# Patient Record
Sex: FEMALE | Race: BLACK OR AFRICAN AMERICAN | NOT HISPANIC OR LATINO | Employment: OTHER | URBAN - METROPOLITAN AREA
[De-identification: names, ages, dates, MRNs, and addresses within clinical notes are randomized per-mention and may not be internally consistent; named-entity substitution may affect disease eponyms.]

---

## 2019-09-23 ENCOUNTER — APPOINTMENT (OUTPATIENT)
Dept: PREADMISSION TESTING | Facility: HOSPITAL | Age: 74
End: 2019-09-23

## 2019-09-23 ENCOUNTER — HOSPITAL ENCOUNTER (OUTPATIENT)
Dept: GENERAL RADIOLOGY | Facility: HOSPITAL | Age: 74
Discharge: HOME | End: 2019-09-23
Admitting: ORTHOPAEDIC SURGERY

## 2019-09-23 VITALS — WEIGHT: 156.97 LBS | BODY MASS INDEX: 28.89 KG/M2 | HEIGHT: 62 IN

## 2019-09-23 LAB
ANION GAP SERPL CALCULATED.3IONS-SCNC: 11 MMOL/L (ref 5–15)
BUN BLD-MCNC: 13 MG/DL (ref 8–23)
BUN/CREAT SERPL: 18.1 (ref 7–25)
CALCIUM SPEC-SCNC: 9.1 MG/DL (ref 8.6–10.5)
CHLORIDE SERPL-SCNC: 102 MMOL/L (ref 98–107)
CO2 SERPL-SCNC: 28 MMOL/L (ref 22–29)
CREAT BLD-MCNC: 0.72 MG/DL (ref 0.57–1)
DEPRECATED RDW RBC AUTO: 49 FL (ref 37–54)
ERYTHROCYTE [DISTWIDTH] IN BLOOD BY AUTOMATED COUNT: 14.4 % (ref 12.3–15.4)
GFR SERPL CREATININE-BSD FRML MDRD: 97 ML/MIN/1.73
GLUCOSE BLD-MCNC: 125 MG/DL (ref 65–99)
HBA1C MFR BLD: 6.6 % (ref 4.8–5.6)
HCT VFR BLD AUTO: 39.4 % (ref 34–46.6)
HGB BLD-MCNC: 12.3 G/DL (ref 12–15.9)
MCH RBC QN AUTO: 28.9 PG (ref 26.6–33)
MCHC RBC AUTO-ENTMCNC: 31.2 G/DL (ref 31.5–35.7)
MCV RBC AUTO: 92.5 FL (ref 79–97)
PLATELET # BLD AUTO: 236 10*3/MM3 (ref 140–450)
PMV BLD AUTO: 9.7 FL (ref 6–12)
POTASSIUM BLD-SCNC: 4.3 MMOL/L (ref 3.5–5.2)
RBC # BLD AUTO: 4.26 10*6/MM3 (ref 3.77–5.28)
SODIUM BLD-SCNC: 141 MMOL/L (ref 136–145)
WBC NRBC COR # BLD: 6.64 10*3/MM3 (ref 3.4–10.8)

## 2019-09-23 PROCEDURE — 93010 ELECTROCARDIOGRAM REPORT: CPT | Performed by: INTERNAL MEDICINE

## 2019-09-23 PROCEDURE — 80048 BASIC METABOLIC PNL TOTAL CA: CPT | Performed by: ORTHOPAEDIC SURGERY

## 2019-09-23 PROCEDURE — 71046 X-RAY EXAM CHEST 2 VIEWS: CPT

## 2019-09-23 PROCEDURE — 36415 COLL VENOUS BLD VENIPUNCTURE: CPT

## 2019-09-23 PROCEDURE — 85027 COMPLETE CBC AUTOMATED: CPT | Performed by: ORTHOPAEDIC SURGERY

## 2019-09-23 PROCEDURE — 83036 HEMOGLOBIN GLYCOSYLATED A1C: CPT | Performed by: ORTHOPAEDIC SURGERY

## 2019-09-23 PROCEDURE — 93005 ELECTROCARDIOGRAM TRACING: CPT

## 2019-09-23 RX ORDER — ASPIRIN 81 MG/1
81 TABLET ORAL DAILY
COMMUNITY

## 2019-09-23 RX ORDER — ATORVASTATIN CALCIUM 40 MG/1
40 TABLET, FILM COATED ORAL DAILY
COMMUNITY

## 2019-09-23 RX ORDER — METOPROLOL SUCCINATE 25 MG/1
25 TABLET, EXTENDED RELEASE ORAL DAILY
COMMUNITY

## 2019-09-23 RX ORDER — RANITIDINE 150 MG/1
150 TABLET ORAL 2 TIMES DAILY
COMMUNITY

## 2019-09-23 NOTE — PAT
Lion with surgery scheduling notified of patient's primary language (English is patient's secondary language) of Tamil.      Patient instructed to drink 20 ounces (or until full) of Gatorade and it needs to be completed 1 hour before given arrival time for procedure (NO RED Gatorade)    Patient verbalized understanding.    Patient given a prescription for Benzol Peroxide 5% wash during PAT visit.  Instructed them to fill the prescription or  from [facility] pharmacy if was submitted electronically by the surgeon's office.  Verbal and written instructions given regarding proper use of the Benzoyl Peroxide wash given to patient and/or famlily during PAT visit. Patient/family also instructed to complete checklist and return it to Pre-op on the day of surgery.  Patient and/or family verbalized understanding.      Additionally, reinforced with patient to acquire this prescription from the [facility] retail pharmacy before leaving the hospital after PAT visit due to the potential unavailability at local pharmacies.

## 2019-09-29 ENCOUNTER — ANESTHESIA EVENT (OUTPATIENT)
Dept: PERIOP | Facility: HOSPITAL | Age: 74
End: 2019-09-29

## 2019-09-29 RX ORDER — FAMOTIDINE 10 MG/ML
20 INJECTION, SOLUTION INTRAVENOUS ONCE
Status: CANCELLED | OUTPATIENT
Start: 2019-09-29 | End: 2019-09-29

## 2019-09-29 RX ORDER — SODIUM CHLORIDE 0.9 % (FLUSH) 0.9 %
3 SYRINGE (ML) INJECTION EVERY 12 HOURS SCHEDULED
Status: CANCELLED | OUTPATIENT
Start: 2019-09-29

## 2019-09-29 RX ORDER — SODIUM CHLORIDE 0.9 % (FLUSH) 0.9 %
3-10 SYRINGE (ML) INJECTION AS NEEDED
Status: CANCELLED | OUTPATIENT
Start: 2019-09-29

## 2019-09-30 ENCOUNTER — APPOINTMENT (OUTPATIENT)
Dept: GENERAL RADIOLOGY | Facility: HOSPITAL | Age: 74
End: 2019-09-30

## 2019-09-30 ENCOUNTER — ANESTHESIA (OUTPATIENT)
Dept: PERIOP | Facility: HOSPITAL | Age: 74
End: 2019-09-30

## 2019-09-30 ENCOUNTER — HOSPITAL ENCOUNTER (INPATIENT)
Facility: HOSPITAL | Age: 74
LOS: 1 days | Discharge: HOME HEALTH | End: 2019-10-01
Attending: ORTHOPAEDIC SURGERY | Admitting: ORTHOPAEDIC SURGERY

## 2019-09-30 DIAGNOSIS — Z74.09 IMPAIRED FUNCTIONAL MOBILITY, BALANCE, GAIT, AND ENDURANCE: ICD-10-CM

## 2019-09-30 DIAGNOSIS — Z96.612 STATUS POST SHOULDER REPLACEMENT, LEFT: ICD-10-CM

## 2019-09-30 DIAGNOSIS — Z74.09 IMPAIRED MOBILITY AND ADLS: Primary | ICD-10-CM

## 2019-09-30 DIAGNOSIS — Z78.9 IMPAIRED MOBILITY AND ADLS: Primary | ICD-10-CM

## 2019-09-30 PROBLEM — F41.9 ANXIETY: Chronic | Status: ACTIVE | Noted: 2019-09-30

## 2019-09-30 PROBLEM — K21.9 GERD (GASTROESOPHAGEAL REFLUX DISEASE): Chronic | Status: ACTIVE | Noted: 2019-09-30

## 2019-09-30 PROBLEM — E11.9 DIABETES MELLITUS (HCC): Chronic | Status: ACTIVE | Noted: 2019-09-30

## 2019-09-30 PROBLEM — I10 HYPERTENSION: Chronic | Status: ACTIVE | Noted: 2019-09-30

## 2019-09-30 PROBLEM — M19.90 ARTHRITIS: Chronic | Status: ACTIVE | Noted: 2019-09-30

## 2019-09-30 LAB
GLUCOSE BLDC GLUCOMTR-MCNC: 100 MG/DL (ref 70–130)
GLUCOSE BLDC GLUCOMTR-MCNC: 117 MG/DL (ref 70–130)
GLUCOSE BLDC GLUCOMTR-MCNC: 139 MG/DL (ref 70–130)
GLUCOSE BLDC GLUCOMTR-MCNC: 209 MG/DL (ref 70–130)

## 2019-09-30 PROCEDURE — C1713 ANCHOR/SCREW BN/BN,TIS/BN: HCPCS | Performed by: ORTHOPAEDIC SURGERY

## 2019-09-30 PROCEDURE — C1776 JOINT DEVICE (IMPLANTABLE): HCPCS | Performed by: ORTHOPAEDIC SURGERY

## 2019-09-30 PROCEDURE — 97110 THERAPEUTIC EXERCISES: CPT | Performed by: OCCUPATIONAL THERAPIST

## 2019-09-30 PROCEDURE — 25010000002 FENTANYL CITRATE (PF) 100 MCG/2ML SOLUTION: Performed by: NURSE ANESTHETIST, CERTIFIED REGISTERED

## 2019-09-30 PROCEDURE — 25010000002 PROPOFOL 10 MG/ML EMULSION: Performed by: NURSE ANESTHETIST, CERTIFIED REGISTERED

## 2019-09-30 PROCEDURE — L3670 SO ACRO/CLAV CAN WEB PRE OTS: HCPCS | Performed by: ORTHOPAEDIC SURGERY

## 2019-09-30 PROCEDURE — 82962 GLUCOSE BLOOD TEST: CPT

## 2019-09-30 PROCEDURE — 25010000002 PHENYLEPHRINE PER 1 ML: Performed by: NURSE ANESTHETIST, CERTIFIED REGISTERED

## 2019-09-30 PROCEDURE — 97535 SELF CARE MNGMENT TRAINING: CPT | Performed by: OCCUPATIONAL THERAPIST

## 2019-09-30 PROCEDURE — 25010000002 ROPIVACAINE PER 1 MG: Performed by: NURSE ANESTHETIST, CERTIFIED REGISTERED

## 2019-09-30 PROCEDURE — 97530 THERAPEUTIC ACTIVITIES: CPT | Performed by: OCCUPATIONAL THERAPIST

## 2019-09-30 PROCEDURE — 0RRK0JZ REPLACEMENT OF LEFT SHOULDER JOINT WITH SYNTHETIC SUBSTITUTE, OPEN APPROACH: ICD-10-PCS | Performed by: ORTHOPAEDIC SURGERY

## 2019-09-30 PROCEDURE — 63710000001 INSULIN LISPRO (HUMAN) PER 5 UNITS: Performed by: NURSE PRACTITIONER

## 2019-09-30 PROCEDURE — 97165 OT EVAL LOW COMPLEX 30 MIN: CPT | Performed by: OCCUPATIONAL THERAPIST

## 2019-09-30 PROCEDURE — 25010000003 CEFAZOLIN IN DEXTROSE 2-4 GM/100ML-% SOLUTION: Performed by: ORTHOPAEDIC SURGERY

## 2019-09-30 PROCEDURE — 25010000002 VANCOMYCIN PER 500 MG: Performed by: ORTHOPAEDIC SURGERY

## 2019-09-30 PROCEDURE — 73030 X-RAY EXAM OF SHOULDER: CPT

## 2019-09-30 PROCEDURE — 25010000002 DEXAMETHASONE PER 1 MG: Performed by: NURSE ANESTHETIST, CERTIFIED REGISTERED

## 2019-09-30 PROCEDURE — 0LS40ZZ REPOSITION LEFT UPPER ARM TENDON, OPEN APPROACH: ICD-10-PCS | Performed by: ORTHOPAEDIC SURGERY

## 2019-09-30 PROCEDURE — 25010000003 LIDOCAINE 1 % SOLUTION: Performed by: NURSE ANESTHETIST, CERTIFIED REGISTERED

## 2019-09-30 PROCEDURE — 99252 IP/OBS CONSLTJ NEW/EST SF 35: CPT | Performed by: NURSE PRACTITIONER

## 2019-09-30 PROCEDURE — 25010000002 ONDANSETRON PER 1 MG: Performed by: NURSE ANESTHETIST, CERTIFIED REGISTERED

## 2019-09-30 DEVICE — SCRW EQUINOXE TORQ DEFINE KT SQ: Type: IMPLANTABLE DEVICE | Site: SHOULDER | Status: FUNCTIONAL

## 2019-09-30 DEVICE — STEM HUM EQUINOXE PRESERVE 7MM: Type: IMPLANTABLE DEVICE | Site: SHOULDER | Status: FUNCTIONAL

## 2019-09-30 DEVICE — IMPLANTABLE DEVICE: Type: IMPLANTABLE DEVICE | Site: SHOULDER | Status: FUNCTIONAL

## 2019-09-30 DEVICE — HD HUM EQUINOXE SHT 41MM: Type: IMPLANTABLE DEVICE | Site: SHOULDER | Status: FUNCTIONAL

## 2019-09-30 DEVICE — SUT FW #2 W/TPR NDL 1/2 CIR 38IN 97CM 26.5MM BLU: Type: IMPLANTABLE DEVICE | Site: SHOULDER | Status: FUNCTIONAL

## 2019-09-30 DEVICE — TOTL SHLDR ARTHROPLASTY SYS: Type: IMPLANTABLE DEVICE | Site: SHOULDER | Status: FUNCTIONAL

## 2019-09-30 DEVICE — SMARTSET HIGH PERFORMANCE MV MEDIUM VISCOSITY BONE CEMENT 40G
Type: IMPLANTABLE DEVICE | Site: SHOULDER | Status: FUNCTIONAL
Brand: SMARTSET

## 2019-09-30 DEVICE — PLT HUM EQUINOXE REPLICAT OFFST 4.5: Type: IMPLANTABLE DEVICE | Site: SHOULDER | Status: FUNCTIONAL

## 2019-09-30 DEVICE — TOTL SHLDR AUG PLT ARTHROPLASTY UPCHRG: Type: IMPLANTABLE DEVICE | Site: SHOULDER | Status: FUNCTIONAL

## 2019-09-30 RX ORDER — HYDRALAZINE HYDROCHLORIDE 20 MG/ML
5 INJECTION INTRAMUSCULAR; INTRAVENOUS
Status: DISCONTINUED | OUTPATIENT
Start: 2019-09-30 | End: 2019-09-30 | Stop reason: HOSPADM

## 2019-09-30 RX ORDER — HYDROMORPHONE HYDROCHLORIDE 1 MG/ML
0.5 INJECTION, SOLUTION INTRAMUSCULAR; INTRAVENOUS; SUBCUTANEOUS
Status: DISCONTINUED | OUTPATIENT
Start: 2019-09-30 | End: 2019-10-01 | Stop reason: HOSPADM

## 2019-09-30 RX ORDER — DULOXETIN HYDROCHLORIDE 20 MG/1
40 CAPSULE, DELAYED RELEASE ORAL DAILY
Status: DISCONTINUED | OUTPATIENT
Start: 2019-10-01 | End: 2019-10-01 | Stop reason: HOSPADM

## 2019-09-30 RX ORDER — METOPROLOL SUCCINATE 25 MG/1
25 TABLET, EXTENDED RELEASE ORAL DAILY
Status: DISCONTINUED | OUTPATIENT
Start: 2019-10-01 | End: 2019-10-01 | Stop reason: HOSPADM

## 2019-09-30 RX ORDER — GLYCOPYRROLATE 0.2 MG/ML
INJECTION INTRAMUSCULAR; INTRAVENOUS AS NEEDED
Status: DISCONTINUED | OUTPATIENT
Start: 2019-09-30 | End: 2019-09-30 | Stop reason: SURG

## 2019-09-30 RX ORDER — PROMETHAZINE HYDROCHLORIDE 25 MG/ML
6.25 INJECTION, SOLUTION INTRAMUSCULAR; INTRAVENOUS ONCE AS NEEDED
Status: DISCONTINUED | OUTPATIENT
Start: 2019-09-30 | End: 2019-09-30 | Stop reason: HOSPADM

## 2019-09-30 RX ORDER — SODIUM CHLORIDE, SODIUM LACTATE, POTASSIUM CHLORIDE, CALCIUM CHLORIDE 600; 310; 30; 20 MG/100ML; MG/100ML; MG/100ML; MG/100ML
9 INJECTION, SOLUTION INTRAVENOUS CONTINUOUS
Status: DISCONTINUED | OUTPATIENT
Start: 2019-09-30 | End: 2019-10-01 | Stop reason: HOSPADM

## 2019-09-30 RX ORDER — ATRACURIUM BESYLATE 10 MG/ML
INJECTION, SOLUTION INTRAVENOUS AS NEEDED
Status: DISCONTINUED | OUTPATIENT
Start: 2019-09-30 | End: 2019-09-30 | Stop reason: SURG

## 2019-09-30 RX ORDER — MAGNESIUM HYDROXIDE 1200 MG/15ML
LIQUID ORAL AS NEEDED
Status: DISCONTINUED | OUTPATIENT
Start: 2019-09-30 | End: 2019-09-30 | Stop reason: HOSPADM

## 2019-09-30 RX ORDER — ONDANSETRON 2 MG/ML
4 INJECTION INTRAMUSCULAR; INTRAVENOUS ONCE AS NEEDED
Status: DISCONTINUED | OUTPATIENT
Start: 2019-09-30 | End: 2019-09-30 | Stop reason: HOSPADM

## 2019-09-30 RX ORDER — LIDOCAINE HYDROCHLORIDE 10 MG/ML
INJECTION, SOLUTION INFILTRATION; PERINEURAL AS NEEDED
Status: DISCONTINUED | OUTPATIENT
Start: 2019-09-30 | End: 2019-09-30 | Stop reason: SURG

## 2019-09-30 RX ORDER — FENTANYL CITRATE 50 UG/ML
50 INJECTION, SOLUTION INTRAMUSCULAR; INTRAVENOUS
Status: DISCONTINUED | OUTPATIENT
Start: 2019-09-30 | End: 2019-09-30 | Stop reason: HOSPADM

## 2019-09-30 RX ORDER — DEXAMETHASONE SODIUM PHOSPHATE 4 MG/ML
INJECTION, SOLUTION INTRA-ARTICULAR; INTRALESIONAL; INTRAMUSCULAR; INTRAVENOUS; SOFT TISSUE AS NEEDED
Status: DISCONTINUED | OUTPATIENT
Start: 2019-09-30 | End: 2019-09-30 | Stop reason: SURG

## 2019-09-30 RX ORDER — DEXTROSE MONOHYDRATE 25 G/50ML
25 INJECTION, SOLUTION INTRAVENOUS
Status: DISCONTINUED | OUTPATIENT
Start: 2019-09-30 | End: 2019-10-01 | Stop reason: HOSPADM

## 2019-09-30 RX ORDER — HYDROCODONE BITARTRATE AND ACETAMINOPHEN 10; 325 MG/1; MG/1
1 TABLET ORAL EVERY 4 HOURS PRN
Status: CANCELLED | OUTPATIENT
Start: 2019-09-30 | End: 2019-10-10

## 2019-09-30 RX ORDER — NEOSTIGMINE METHYLSULFATE 5 MG/5 ML
SYRINGE (ML) INTRAVENOUS AS NEEDED
Status: DISCONTINUED | OUTPATIENT
Start: 2019-09-30 | End: 2019-09-30 | Stop reason: SURG

## 2019-09-30 RX ORDER — LABETALOL HYDROCHLORIDE 5 MG/ML
5 INJECTION, SOLUTION INTRAVENOUS
Status: DISCONTINUED | OUTPATIENT
Start: 2019-09-30 | End: 2019-09-30 | Stop reason: HOSPADM

## 2019-09-30 RX ORDER — PROMETHAZINE HYDROCHLORIDE 25 MG/1
25 TABLET ORAL ONCE AS NEEDED
Status: DISCONTINUED | OUTPATIENT
Start: 2019-09-30 | End: 2019-09-30 | Stop reason: HOSPADM

## 2019-09-30 RX ORDER — DOCUSATE SODIUM 100 MG/1
100 CAPSULE, LIQUID FILLED ORAL 2 TIMES DAILY PRN
Status: DISCONTINUED | OUTPATIENT
Start: 2019-09-30 | End: 2019-10-01 | Stop reason: HOSPADM

## 2019-09-30 RX ORDER — ONDANSETRON 2 MG/ML
INJECTION INTRAMUSCULAR; INTRAVENOUS AS NEEDED
Status: DISCONTINUED | OUTPATIENT
Start: 2019-09-30 | End: 2019-09-30 | Stop reason: SURG

## 2019-09-30 RX ORDER — OXYCODONE HYDROCHLORIDE 5 MG/1
5 TABLET ORAL EVERY 4 HOURS PRN
Status: DISCONTINUED | OUTPATIENT
Start: 2019-09-30 | End: 2019-10-01 | Stop reason: HOSPADM

## 2019-09-30 RX ORDER — PROPOFOL 10 MG/ML
VIAL (ML) INTRAVENOUS AS NEEDED
Status: DISCONTINUED | OUTPATIENT
Start: 2019-09-30 | End: 2019-09-30 | Stop reason: SURG

## 2019-09-30 RX ORDER — FAMOTIDINE 20 MG/1
20 TABLET, FILM COATED ORAL
Status: DISCONTINUED | OUTPATIENT
Start: 2019-09-30 | End: 2019-10-01 | Stop reason: HOSPADM

## 2019-09-30 RX ORDER — SODIUM CHLORIDE 450 MG/100ML
50 INJECTION, SOLUTION INTRAVENOUS CONTINUOUS
Status: DISCONTINUED | OUTPATIENT
Start: 2019-09-30 | End: 2019-10-01 | Stop reason: HOSPADM

## 2019-09-30 RX ORDER — PROMETHAZINE HYDROCHLORIDE 25 MG/1
25 SUPPOSITORY RECTAL ONCE AS NEEDED
Status: DISCONTINUED | OUTPATIENT
Start: 2019-09-30 | End: 2019-09-30 | Stop reason: HOSPADM

## 2019-09-30 RX ORDER — BUPIVACAINE HYDROCHLORIDE 2.5 MG/ML
INJECTION, SOLUTION EPIDURAL; INFILTRATION; INTRACAUDAL
Status: COMPLETED | OUTPATIENT
Start: 2019-09-30 | End: 2019-09-30

## 2019-09-30 RX ORDER — VANCOMYCIN HYDROCHLORIDE 1 G/200ML
15 INJECTION, SOLUTION INTRAVENOUS ONCE
Status: COMPLETED | OUTPATIENT
Start: 2019-10-01 | End: 2019-10-01

## 2019-09-30 RX ORDER — NALOXONE HCL 0.4 MG/ML
0.1 VIAL (ML) INJECTION
Status: DISCONTINUED | OUTPATIENT
Start: 2019-09-30 | End: 2019-10-01 | Stop reason: HOSPADM

## 2019-09-30 RX ORDER — FAMOTIDINE 20 MG/1
20 TABLET, FILM COATED ORAL ONCE
Status: COMPLETED | OUTPATIENT
Start: 2019-09-30 | End: 2019-09-30

## 2019-09-30 RX ORDER — NICOTINE POLACRILEX 4 MG
15 LOZENGE BUCCAL
Status: DISCONTINUED | OUTPATIENT
Start: 2019-09-30 | End: 2019-10-01 | Stop reason: HOSPADM

## 2019-09-30 RX ORDER — LIDOCAINE HYDROCHLORIDE 10 MG/ML
0.5 INJECTION, SOLUTION EPIDURAL; INFILTRATION; INTRACAUDAL; PERINEURAL ONCE AS NEEDED
Status: COMPLETED | OUTPATIENT
Start: 2019-09-30 | End: 2019-09-30

## 2019-09-30 RX ORDER — ACETAMINOPHEN 650 MG
TABLET, EXTENDED RELEASE ORAL AS NEEDED
Status: DISCONTINUED | OUTPATIENT
Start: 2019-09-30 | End: 2019-09-30 | Stop reason: HOSPADM

## 2019-09-30 RX ORDER — VANCOMYCIN HYDROCHLORIDE 1 G/200ML
15 INJECTION, SOLUTION INTRAVENOUS ONCE
Status: COMPLETED | OUTPATIENT
Start: 2019-09-30 | End: 2019-09-30

## 2019-09-30 RX ORDER — ACETAMINOPHEN 500 MG
1000 TABLET ORAL ONCE
Status: COMPLETED | OUTPATIENT
Start: 2019-09-30 | End: 2019-09-30

## 2019-09-30 RX ORDER — BISACODYL 5 MG/1
10 TABLET, DELAYED RELEASE ORAL DAILY PRN
Status: DISCONTINUED | OUTPATIENT
Start: 2019-09-30 | End: 2019-10-01 | Stop reason: HOSPADM

## 2019-09-30 RX ORDER — CEFAZOLIN SODIUM 2 G/100ML
2 INJECTION, SOLUTION INTRAVENOUS ONCE
Status: COMPLETED | OUTPATIENT
Start: 2019-09-30 | End: 2019-09-30

## 2019-09-30 RX ORDER — ATORVASTATIN CALCIUM 40 MG/1
40 TABLET, FILM COATED ORAL DAILY
Status: DISCONTINUED | OUTPATIENT
Start: 2019-09-30 | End: 2019-10-01 | Stop reason: HOSPADM

## 2019-09-30 RX ORDER — FENTANYL CITRATE 50 UG/ML
INJECTION, SOLUTION INTRAMUSCULAR; INTRAVENOUS
Status: COMPLETED | OUTPATIENT
Start: 2019-09-30 | End: 2019-09-30

## 2019-09-30 RX ADMIN — PROPOFOL 30 MG: 10 INJECTION, EMULSION INTRAVENOUS at 12:15

## 2019-09-30 RX ADMIN — PHENYLEPHRINE HYDROCHLORIDE 100 MCG: 10 INJECTION INTRAVENOUS at 13:18

## 2019-09-30 RX ADMIN — INSULIN LISPRO 3 UNITS: 100 INJECTION, SOLUTION INTRAVENOUS; SUBCUTANEOUS at 21:42

## 2019-09-30 RX ADMIN — LIDOCAINE HYDROCHLORIDE 0.2 ML: 10 INJECTION, SOLUTION EPIDURAL; INFILTRATION; INTRACAUDAL; PERINEURAL at 10:05

## 2019-09-30 RX ADMIN — BUPIVACAINE HYDROCHLORIDE 15 ML: 2.5 INJECTION, SOLUTION EPIDURAL; INFILTRATION; INTRACAUDAL; PERINEURAL at 10:47

## 2019-09-30 RX ADMIN — CEFAZOLIN SODIUM 2 G: 2 INJECTION, SOLUTION INTRAVENOUS at 11:26

## 2019-09-30 RX ADMIN — SODIUM CHLORIDE 50 ML/HR: 4.5 INJECTION, SOLUTION INTRAVENOUS at 17:48

## 2019-09-30 RX ADMIN — ACETAMINOPHEN 1000 MG: 500 TABLET ORAL at 10:16

## 2019-09-30 RX ADMIN — PROPOFOL 100 MG: 10 INJECTION, EMULSION INTRAVENOUS at 12:12

## 2019-09-30 RX ADMIN — FAMOTIDINE 20 MG: 20 TABLET ORAL at 17:48

## 2019-09-30 RX ADMIN — Medication 3 MG: at 13:45

## 2019-09-30 RX ADMIN — EPHEDRINE SULFATE 10 MG: 50 INJECTION INTRAMUSCULAR; INTRAVENOUS; SUBCUTANEOUS at 12:26

## 2019-09-30 RX ADMIN — GLYCOPYRROLATE 0.4 MG: 0.2 INJECTION, SOLUTION INTRAMUSCULAR; INTRAVENOUS at 13:45

## 2019-09-30 RX ADMIN — ONDANSETRON 4 MG: 2 INJECTION INTRAMUSCULAR; INTRAVENOUS at 13:45

## 2019-09-30 RX ADMIN — SODIUM CHLORIDE, POTASSIUM CHLORIDE, SODIUM LACTATE AND CALCIUM CHLORIDE 9 ML/HR: 600; 310; 30; 20 INJECTION, SOLUTION INTRAVENOUS at 10:05

## 2019-09-30 RX ADMIN — OXYCODONE HYDROCHLORIDE 5 MG: 5 TABLET ORAL at 17:48

## 2019-09-30 RX ADMIN — ATORVASTATIN CALCIUM 40 MG: 40 TABLET, FILM COATED ORAL at 17:48

## 2019-09-30 RX ADMIN — FENTANYL CITRATE 50 MCG: 0.05 INJECTION, SOLUTION INTRAMUSCULAR; INTRAVENOUS at 14:56

## 2019-09-30 RX ADMIN — PHENYLEPHRINE HYDROCHLORIDE 100 MCG: 10 INJECTION INTRAVENOUS at 12:50

## 2019-09-30 RX ADMIN — PROPOFOL 25 MCG/KG/MIN: 10 INJECTION, EMULSION INTRAVENOUS at 12:46

## 2019-09-30 RX ADMIN — ATRACURIUM BESYLATE 40 MG: 10 INJECTION, SOLUTION INTRAVENOUS at 12:12

## 2019-09-30 RX ADMIN — DEXAMETHASONE SODIUM PHOSPHATE 8 MG: 4 INJECTION, SOLUTION INTRAMUSCULAR; INTRAVENOUS at 12:28

## 2019-09-30 RX ADMIN — FENTANYL CITRATE 100 MCG: 50 INJECTION, SOLUTION INTRAMUSCULAR; INTRAVENOUS at 10:47

## 2019-09-30 RX ADMIN — LIDOCAINE HYDROCHLORIDE 50 MG: 10 INJECTION, SOLUTION INFILTRATION; PERINEURAL at 12:12

## 2019-09-30 RX ADMIN — EPHEDRINE SULFATE 10 MG: 50 INJECTION INTRAMUSCULAR; INTRAVENOUS; SUBCUTANEOUS at 12:31

## 2019-09-30 RX ADMIN — SODIUM CHLORIDE, POTASSIUM CHLORIDE, SODIUM LACTATE AND CALCIUM CHLORIDE: 600; 310; 30; 20 INJECTION, SOLUTION INTRAVENOUS at 13:52

## 2019-09-30 RX ADMIN — PHENYLEPHRINE HYDROCHLORIDE 100 MCG: 10 INJECTION INTRAVENOUS at 12:45

## 2019-09-30 RX ADMIN — PHENYLEPHRINE HYDROCHLORIDE 100 MCG: 10 INJECTION INTRAVENOUS at 12:32

## 2019-09-30 RX ADMIN — ROPIVACAINE HYDROCHLORIDE 6 ML/HR: 5 INJECTION, SOLUTION EPIDURAL; INFILTRATION; PERINEURAL at 14:22

## 2019-09-30 RX ADMIN — FAMOTIDINE 20 MG: 20 TABLET ORAL at 10:16

## 2019-09-30 RX ADMIN — VANCOMYCIN HYDROCHLORIDE 1000 MG: 1 INJECTION, SOLUTION INTRAVENOUS at 11:54

## 2019-10-01 VITALS
WEIGHT: 156.97 LBS | TEMPERATURE: 98.7 F | BODY MASS INDEX: 28.89 KG/M2 | HEIGHT: 62 IN | OXYGEN SATURATION: 99 % | DIASTOLIC BLOOD PRESSURE: 87 MMHG | SYSTOLIC BLOOD PRESSURE: 140 MMHG | HEART RATE: 76 BPM | RESPIRATION RATE: 16 BRPM

## 2019-10-01 LAB
ANION GAP SERPL CALCULATED.3IONS-SCNC: 13 MMOL/L (ref 5–15)
BASOPHILS # BLD AUTO: 0.02 10*3/MM3 (ref 0–0.2)
BASOPHILS NFR BLD AUTO: 0.2 % (ref 0–1.5)
BUN BLD-MCNC: 10 MG/DL (ref 8–23)
BUN/CREAT SERPL: 15.2 (ref 7–25)
CALCIUM SPEC-SCNC: 9.4 MG/DL (ref 8.6–10.5)
CHLORIDE SERPL-SCNC: 97 MMOL/L (ref 98–107)
CO2 SERPL-SCNC: 27 MMOL/L (ref 22–29)
CREAT BLD-MCNC: 0.66 MG/DL (ref 0.57–1)
DEPRECATED RDW RBC AUTO: 48.8 FL (ref 37–54)
EOSINOPHIL # BLD AUTO: 0 10*3/MM3 (ref 0–0.4)
EOSINOPHIL NFR BLD AUTO: 0 % (ref 0.3–6.2)
ERYTHROCYTE [DISTWIDTH] IN BLOOD BY AUTOMATED COUNT: 14.4 % (ref 12.3–15.4)
GFR SERPL CREATININE-BSD FRML MDRD: 107 ML/MIN/1.73
GLUCOSE BLD-MCNC: 119 MG/DL (ref 65–99)
GLUCOSE BLDC GLUCOMTR-MCNC: 167 MG/DL (ref 70–130)
GLUCOSE BLDC GLUCOMTR-MCNC: 229 MG/DL (ref 70–130)
HCT VFR BLD AUTO: 39.5 % (ref 34–46.6)
HGB BLD-MCNC: 12.3 G/DL (ref 12–15.9)
IMM GRANULOCYTES # BLD AUTO: 0.02 10*3/MM3 (ref 0–0.05)
IMM GRANULOCYTES NFR BLD AUTO: 0.2 % (ref 0–0.5)
LYMPHOCYTES # BLD AUTO: 0.86 10*3/MM3 (ref 0.7–3.1)
LYMPHOCYTES NFR BLD AUTO: 9.8 % (ref 19.6–45.3)
MCH RBC QN AUTO: 28.9 PG (ref 26.6–33)
MCHC RBC AUTO-ENTMCNC: 31.1 G/DL (ref 31.5–35.7)
MCV RBC AUTO: 92.9 FL (ref 79–97)
MONOCYTES # BLD AUTO: 0.92 10*3/MM3 (ref 0.1–0.9)
MONOCYTES NFR BLD AUTO: 10.5 % (ref 5–12)
NEUTROPHILS # BLD AUTO: 6.97 10*3/MM3 (ref 1.7–7)
NEUTROPHILS NFR BLD AUTO: 79.3 % (ref 42.7–76)
NRBC BLD AUTO-RTO: 0 /100 WBC (ref 0–0.2)
PLATELET # BLD AUTO: 249 10*3/MM3 (ref 140–450)
PMV BLD AUTO: 9.8 FL (ref 6–12)
POTASSIUM BLD-SCNC: 4.5 MMOL/L (ref 3.5–5.2)
RBC # BLD AUTO: 4.25 10*6/MM3 (ref 3.77–5.28)
SODIUM BLD-SCNC: 137 MMOL/L (ref 136–145)
WBC NRBC COR # BLD: 8.79 10*3/MM3 (ref 3.4–10.8)

## 2019-10-01 PROCEDURE — 80048 BASIC METABOLIC PNL TOTAL CA: CPT | Performed by: STUDENT IN AN ORGANIZED HEALTH CARE EDUCATION/TRAINING PROGRAM

## 2019-10-01 PROCEDURE — 25010000002 VANCOMYCIN PER 500 MG: Performed by: STUDENT IN AN ORGANIZED HEALTH CARE EDUCATION/TRAINING PROGRAM

## 2019-10-01 PROCEDURE — 97110 THERAPEUTIC EXERCISES: CPT | Performed by: OCCUPATIONAL THERAPIST

## 2019-10-01 PROCEDURE — 85025 COMPLETE CBC W/AUTO DIFF WBC: CPT | Performed by: STUDENT IN AN ORGANIZED HEALTH CARE EDUCATION/TRAINING PROGRAM

## 2019-10-01 PROCEDURE — 97535 SELF CARE MNGMENT TRAINING: CPT | Performed by: OCCUPATIONAL THERAPIST

## 2019-10-01 PROCEDURE — 97161 PT EVAL LOW COMPLEX 20 MIN: CPT

## 2019-10-01 PROCEDURE — 25010000002 ENOXAPARIN PER 10 MG: Performed by: STUDENT IN AN ORGANIZED HEALTH CARE EDUCATION/TRAINING PROGRAM

## 2019-10-01 PROCEDURE — 99239 HOSP IP/OBS DSCHRG MGMT >30: CPT | Performed by: NURSE PRACTITIONER

## 2019-10-01 PROCEDURE — 82962 GLUCOSE BLOOD TEST: CPT

## 2019-10-01 RX ORDER — OXYCODONE HYDROCHLORIDE 5 MG/1
5 TABLET ORAL EVERY 4 HOURS PRN
Qty: 25 TABLET | Refills: 0 | Status: SHIPPED | OUTPATIENT
Start: 2019-10-01 | End: 2019-10-10

## 2019-10-01 RX ORDER — ACETAMINOPHEN 325 MG/1
650 TABLET ORAL EVERY 6 HOURS PRN
Status: DISCONTINUED | OUTPATIENT
Start: 2019-10-01 | End: 2019-10-01 | Stop reason: HOSPADM

## 2019-10-01 RX ADMIN — FAMOTIDINE 20 MG: 20 TABLET ORAL at 08:26

## 2019-10-01 RX ADMIN — METOPROLOL SUCCINATE 25 MG: 25 TABLET, EXTENDED RELEASE ORAL at 08:26

## 2019-10-01 RX ADMIN — OXYCODONE HYDROCHLORIDE 5 MG: 5 TABLET ORAL at 11:19

## 2019-10-01 RX ADMIN — DULOXETINE HYDROCHLORIDE 40 MG: 20 CAPSULE, DELAYED RELEASE ORAL at 08:27

## 2019-10-01 RX ADMIN — VANCOMYCIN HYDROCHLORIDE 1000 MG: 1 INJECTION, SOLUTION INTRAVENOUS at 00:30

## 2019-10-01 RX ADMIN — ATORVASTATIN CALCIUM 40 MG: 40 TABLET, FILM COATED ORAL at 08:27

## 2019-10-01 RX ADMIN — OXYCODONE HYDROCHLORIDE 5 MG: 5 TABLET ORAL at 16:05

## 2019-10-01 RX ADMIN — ACETAMINOPHEN 650 MG: 325 TABLET ORAL at 08:40

## 2019-10-01 RX ADMIN — ENOXAPARIN SODIUM 40 MG: 40 INJECTION SUBCUTANEOUS at 08:26

## 2019-10-01 RX ADMIN — INSULIN LISPRO 2 UNITS: 100 INJECTION, SOLUTION INTRAVENOUS; SUBCUTANEOUS at 08:26

## 2019-10-01 RX ADMIN — INSULIN LISPRO 3 UNITS: 100 INJECTION, SOLUTION INTRAVENOUS; SUBCUTANEOUS at 11:48

## 2019-10-01 NOTE — PLAN OF CARE
Problem: Patient Care Overview  Goal: Plan of Care Review  Outcome: Outcome(s) achieved Date Met: 10/01/19   10/01/19 1045   Plan of Care Review   Progress improving   OTHER   Outcome Summary Interscalene infusing at 6, post pain 2/10 left axilla region. Pt tolerated L shoulder PROM , IR chest/ER 30 with excellent teachback from JULIUS. DIL present for training, family has hired private caregiver who will be assisting with PROM and sling application. JULIUS desires  therapy to assist with training of caregiver, CM aware. Recommend DC home with family assist. Issued rehab department phone number and enourged them to call with questions.    Coping/Psychosocial   Plan of Care Reviewed With patient       Problem: Shoulder Arthroplasty (Adult)  Goal: Signs and Symptoms of Listed Potential Problems Will be Absent, Minimized or Managed (Shoulder Arthroplasty)  Outcome: Ongoing (interventions implemented as appropriate)   10/01/19 1045   Goal/Outcome Evaluation   Problems Assessed (Shoulder Arthro) functional deficit;pain   Problems Present (Shoulder Arthro) functional deficit;pain

## 2019-10-01 NOTE — THERAPY DISCHARGE NOTE
Patient Name: Morris Whiting  : 1945    MRN: [id number]                              Today's Date: 10/1/2019       Admit Date: 2019    Visit Dx:     ICD-10-CM ICD-9-CM   1. Impaired mobility and ADLs Z74.09 799.89   2. Impaired functional mobility, balance, gait, and endurance Z74.09 V49.89     Patient Active Problem List   Diagnosis   • Status post shoulder replacement, left   • Diabetes mellitus (CMS/HCC)   • Hypertension   • GERD (gastroesophageal reflux disease)   • Anxiety   • Arthritis     Past Medical History:   Diagnosis Date   • Anxiety    • Arthritis    • Diabetes mellitus (CMS/HCC)    • GERD (gastroesophageal reflux disease)    • History of DVT (deep vein thrombosis)     following Lt knee surgery    • Hypertension      Past Surgical History:   Procedure Laterality Date   • HYSTERECTOMY     • INCISIONAL HERNIA REPAIR     • KNEE ARTHROPLASTY Bilateral    • TONSILLECTOMY       General Information     Row Name 10/01/19 0820          PT Evaluation Time/Intention    Document Type  discharge evaluation/summary  -     Mode of Treatment  individual therapy  -     Row Name 10/01/19 08          General Information    Patient Profile Reviewed?  yes  -     Prior Level of Function  independent:;all household mobility;community mobility;gait;transfer;min assist:;ADL's  -     Existing Precautions/Restrictions  fall;left;non-weight bearing;shoulder  -     Barriers to Rehab  none identified  -     Row Name 10/01/19 0820          Relationship/Environment    Lives With  child(dennis), adult;grandchild(dennis)  -     Row Name 10/01/19 0820          Resource/Environmental Concerns    Current Living Arrangements  home/apartment/condo  -     Row Name 10/01/19 0820          Cognitive Assessment/Intervention- PT/OT    Orientation Status (Cognition)  oriented x 4  -     Row Name 10/01/19 0820          Safety Issues, Functional Mobility    Safety Issues Affecting Function (Mobility)  awareness of  need for assistance;insight into deficits/self awareness  -     Impairments Affecting Function (Mobility)  endurance/activity tolerance;pain;sensation/sensory awareness;balance  -SJ       User Key  (r) = Recorded By, (t) = Taken By, (c) = Cosigned By    Initials Name Provider Type    Ivana Barragan PT Physical Therapist        Mobility     Row Name 10/01/19 0938          Bed Mobility Assessment/Treatment    Bed Mobility Assessment/Treatment  supine-sit-supine  -SJ     Scooting/Bridging Jewell (Bed Mobility)  conditional independence  -SJ     Supine-Sit Jewell (Bed Mobility)  conditional independence  -SJ     Supine-Sit-Supine Jewell (Bed Mobility)  conditional independence  -SJ     Assistive Device (Bed Mobility)  bed rails;head of bed elevated  -SJ     Comment (Bed Mobility)  pt able to maintain NWB LUE  -SJ     Row Name 10/01/19 0938          Transfer Assessment/Treatment    Comment (Transfers)  cues for line management  -SJ     Row Name 10/01/19 0938          Sit-Stand Transfer    Sit-Stand Jewell (Transfers)  supervision  -     Row Name 10/01/19 0938          Gait/Stairs Assessment/Training    Gait/Stairs Assessment/Training  gait/ambulation assistive device  -     Jewell Level (Gait)  contact guard  -SJ     Assistive Device (Gait)  cane, straight  -SJ     Distance in Feet (Gait)  300  -SJ     Pattern (Gait)  step-through  -SJ     Deviations/Abnormal Patterns (Gait)  bilateral deviations;gait speed decreased  -SJ     Comment (Gait/Stairs)  good safety with use of straight cane. No LOB.  -SJ     Row Name 10/01/19 0938          Mobility Assessment/Intervention    Extremity Weight-bearing Status  left upper extremity  -SJ     Left Upper Extremity (Weight-bearing Status)  non weight-bearing (NWB)  -SJ       User Key  (r) = Recorded By, (t) = Taken By, (c) = Cosigned By    Initials Name Provider Type    Ivana Barragan PT Physical Therapist        Obj/Interventions      Row Name 10/01/19 0940          General ROM    GENERAL ROM COMMENTS  BLE's WFL  -SJ     Row Name 10/01/19 0940          MMT (Manual Muscle Testing)    General MMT Comments  BLE's 5/5  -SJ     Row Name 10/01/19 0940          Static Sitting Balance    Level of Scurry (Unsupported Sitting, Static Balance)  independent  -SJ     Sitting Position (Unsupported Sitting, Static Balance)  sitting on edge of bed  -SJ     Row Name 10/01/19 0940          Dynamic Sitting Balance    Level of Scurry, Reaches Outside Midline (Sitting, Dynamic Balance)  conditional independence  -SJ     Sitting Position, Reaches Outside Midline (Sitting, Dynamic Balance)  sitting on edge of bed  -     Row Name 10/01/19 0940          Static Standing Balance    Level of Scurry (Supported Standing, Static Balance)  independent  -     Row Name 10/01/19 0940          Dynamic Standing Balance    Level of Scurry, Reaches Outside Midline (Standing, Dynamic Balance)  supervision  -       User Key  (r) = Recorded By, (t) = Taken By, (c) = Cosigned By    Initials Name Provider Type    Ivana Barragan PT Physical Therapist        Goals/Plan    No documentation.       Clinical Impression     Row Name 10/01/19 0941          Pain Assessment    Additional Documentation  Pain Scale: FACES Pre/Post-Treatment (Group)  -[facility] Name 10/01/19 0941          Pain Scale: Numbers Pre/Post-Treatment    Pain Location - Side  Left  -SJ     Pain Location  shoulder  -SJ     Pain Intervention(s)  Repositioned;Ambulation/increased activity  -     Row Name 10/01/19 0941          Pain Scale: FACES Pre/Post-Treatment    Pain: FACES Scale, Pretreatment  2-->hurts little bit  -     Pain: FACES Scale, Post-Treatment  2-->hurts little bit  -     Row Name 10/01/19 0941          Plan of Care Review    Plan of Care Reviewed With  patient  -     Row Name 10/01/19 0941          Physical Therapy Clinical Impression    Patient/Family Goals Statement  (PT Clinical Impression)  return to home  -     Criteria for Skilled Interventions Met (PT Clinical Impression)  yes  -     Predicted Duration of Therapy (PT)  eval only due to d/c home today  -     Row Name 10/01/19 0941          Vital Signs    Pre Systolic BP Rehab  140  -SJ     Pre Treatment Diastolic BP  87  -SJ     Pre SpO2 (%)  100  -SJ     O2 Delivery Pre Treatment  room air  -     Row Name 10/01/19 0941          Positioning and Restraints    Pre-Treatment Position  in bed  -     Post Treatment Position  bed  -     In Bed  fowlers;encouraged to call for assist;call light within reach;SCD pump applied  -       User Key  (r) = Recorded By, (t) = Taken By, (c) = Cosigned By    Initials Name Provider Type    Ivana Barragan PT Physical Therapist        Outcome Measures    No documentation.       Physical Therapy Education     Title: PT OT SLP Therapies (Done)     Topic: Physical Therapy (Done)     Point: Mobility training (Done)     Learning Progress Summary           Patient Acceptance, E,D, VU,DU by  at 10/1/2019  9:43 AM                   Point: Home exercise program (Done)     Learning Progress Summary           Patient Acceptance, E,D, VU,DU by  at 10/1/2019  9:43 AM                   Point: Body mechanics (Done)     Learning Progress Summary           Patient Acceptance, E,D, VU,DU by  at 10/1/2019  9:43 AM                   Point: Precautions (Done)     Learning Progress Summary           Patient Acceptance, E,D, VU,DU by  at 10/1/2019  9:43 AM                               User Key     Initials Effective Dates Name Provider Type [facility] 06/19/15 -  Ivana Gregg PT Physical Therapist PT              PT Recommendation and Plan     Outcome Summary/Treatment Plan (PT)  Anticipated Equipment Needs at Discharge (PT): standard cane(issued)  Anticipated Discharge Disposition (PT): home with assist  Plan of Care Reviewed With: patient  Outcome Summary: PT eval completed. Pt  presents with difficulty ambulating per PLOF. Pt mildly unsteady when ambulating without assistive device. Improved with use of straight cane, x300ft with CGA. PT recommends d/c home with assist.     Time Calculation:   PT Charges     Row Name 10/01/19 0949             Time Calculation    Start Time  0825  -SJ      PT Received On  10/01/19  -        User Key  (r) = Recorded By, (t) = Taken By, (c) = Cosigned By    Initials Name Provider Type     Ivana Gregg, CLARA Physical Therapist        Therapy Charges for Today     Code Description Service Date Service Provider Modifiers Qty    [id number]  PT EVAL LOW COMPLEXITY 3 10/1/2019 Ivana Gregg, PT GP 1          PT G-Codes  Outcome Measure Options: AM-PAC 6 Clicks Basic Mobility (PT)  AM-PAC 6 Clicks Score (PT): 22  AM-PAC 6 Clicks Score (OT): 14    PT Discharge Summary  Anticipated Discharge Disposition (PT): home with assist    Ivana Gregg PT  10/1/2019

## 2019-10-01 NOTE — PROGRESS NOTES
Discharge Planning Assessment  [facility]     Patient Name: Morris Whiting  MRN: [id number]  Today's Date: 10/1/2019    Admit Date: 9/30/2019    Discharge Needs Assessment    No documentation.       Discharge Plan     Row Name 10/01/19 1347       Plan    Plan  Home w/ HH    Patient/Family in Agreement with Plan  yes    Plan Comments  Spoke w/ patient, , and daughter in law at bedside. Patient lives with her , dtr, son in law, and grandchildren in a two story in [facility]. There is a bed and bath on the first floor. PTA she was independent with ADL's and mobility. Patient's family have arranged for daily caregivers while patient recovers. Per OT recs she would benefit from continued OT at [place]. Patient agreeable. [facility]  is the only HH agency locally that will accept patient's insurance. Referral made to Amber at [email or web address] for HH/SN/OT. Patient was provided a straight cane by therapy. No other needs noted. CM following.         Destination      No service coordination in this encounter.      Durable Medical Equipment      No service coordination in this encounter.      Dialysis/Infusion      No service coordination in this encounter.      Home Medical Care      Service Provider Request Status Selected Services Address Phone Number Fax Number    [facility] Pending - No Request Sent N/A [id number] [place], [place] [id number] [phone]      Therapy      No service coordination in this encounter.      Community Resources      No service coordination in this encounter.        Expected Discharge Date and Time     Expected Discharge Date Expected Discharge Time    Oct 1, 2019         Demographic Summary    No documentation.       Functional Status    No documentation.       Psychosocial    No documentation.       Abuse/Neglect    No documentation.       Legal    No documentation.       Substance Abuse    No documentation.       Patient Forms    No documentation.            Kristie Quan RN  Discharge Planning Assessment  [facility]     Patient Name: Morris Whiting  MRN: [id number]  Today's Date: 10/1/2019    Admit Date: 9/30/2019    Discharge Needs Assessment    No documentation.       Discharge Plan     Row Name 10/01/19 1347       Plan    Plan  Home w/ HH    Patient/Family in Agreement with Plan  yes    Plan Comments  Spoke w/ patient, , and daughter in law at bedside. Patient lives with her , dtr, son in law, and grandchildren in a two story in [facility]. There is a bed and bath on the first floor. PTA she was independent with ADL's and mobility. Patient's family have arranged for daily caregivers while patient recovers. Per OT recs she would benefit from continued OT at [place]. Patient agreeable. [facility]  is the only  agency locally that will accept patient's insurance. Referral made to Amber at [facility] for HH/SN/OT. Patient was provided a straight cane by therapy. No other needs noted. CM following.         Destination      No service coordination in this encounter.      Durable Medical Equipment      No service coordination in this encounter.      Dialysis/Infusion      No service coordination in this encounter.      Home Medical Care      Service Provider Request Status Selected Services Address Phone Number Fax Number    [facility] Pending - No Request Sent N/A [id number] [place] [id number] [phone]      Therapy      No service coordination in this encounter.      Community Resources      No service coordination in this encounter.        Expected Discharge Date and Time     Expected Discharge Date Expected Discharge Time    Oct 1, 2019         Demographic Summary    No documentation.       Functional Status    No documentation.       Psychosocial    No documentation.       Abuse/Neglect    No documentation.       Legal    No documentation.       Substance Abuse    No documentation.       Patient  Forms    No documentation.           Kristie Quan RN

## 2019-10-01 NOTE — PAYOR COMM NOTE
"Debra Sands RN Utilization Review [phone]  Fax # [phone]  Ref # [id number]    Please note discharge date.      Maria Luz Whiting (74 y.o. Female)     Date of Birth Social Security Number Address Home Phone MRN    1945  [address] [facility] [address]  [id number]    [employer] Marital Status          None        Admission Date Admission Type Admitting Provider Attending Provider Department, Room/Bed    19 Elective Srini Davies MD Sajadi, Kaveh Robert, MD [facility] 3H, S383/1    Discharge Date Discharge Disposition Discharge Destination         Home or Self Care              Attending Provider:  Srini Davies MD    Allergies:  No Known Allergies    Isolation:  None   Infection:  None   Code Status:  CPR    Ht:  157.5 cm (62.01\")   Wt:  71.2 kg (156 lb 15.5 oz)    Admission Cmt:  None   Principal Problem:  Status post shoulder replacement, left [Z96.612]                 Active Insurance as of 2019     Primary Coverage     Payor Plan Insurance Group Employer/Plan Group    [facility] [place]     Payor Plan Address Payor Plan Phone Number Payor Plan Fax Number Effective Dates    PO    2019 - None Entered    [facility] [address]       Subscriber Name Subscriber Birth Date Member ID       MARIA LUZ WHITING 1945 [id number]                 Emergency Contacts      (Rel.) Home Phone Work Phone Mobile Phone    wilton whiting (Son) -- -- [phone]               Discharge Summary      Rosi Foss APRN at 10/01/19 1014              [facility] Medicine Services  DISCHARGE SUMMARY    Patient Name: Maria Luz Whiting  : 1945  MRN: [id number]    Date of Admission: 2019  Date of Discharge: 10/1/2019  Primary Care Physician: Mike Olsen MD    Consults     Date and Time Order Name Status Description    2019 1428 Inpatient Consult to Hospitalist " Completed         Hospital Course   Presenting Problem:   Status post shoulder replacement, left [Z96.612]    Active Hospital Problems    Diagnosis  POA   • **Status post shoulder replacement, left [Z96.612]  Not Applicable   • Diabetes mellitus (CMS/HCC) [E11.9]  Yes   • Hypertension [I10]  Yes   • GERD (gastroesophageal reflux disease) [K21.9]  Yes   • Anxiety [F41.9]  Yes   • Arthritis [M19.90]  Yes      Resolved Hospital Problems   No resolved problems to display.      Hospital Course:  Morris Whiting is a 74 y.o. female with PMH significant for anxiety, OA, DM type II, GERD, HTN, and DVT of LLE 2012 s/p knee surgery.  Pt was admitted to Dr Davies on 9/30/2019 for elective left shoulder repair.  She underwent left total shoulder arthroplasty.  Hospitalist were consulted for medical mgmt post op.    Patient did well overnight.  Patient worked well with OT this afternoon and this morning.  Patient states she is ready for discharge.  Patient did need some Tylenol this morning for her anterior left underarm pain.  No adverse events overnight.  No family in the room.  Patient will be discharged home and family will transport.  Discharge follow-up recommendations and appointments are listed below.    Discharge Follow Up Recommendations for labs/diagnostics:  --Follow-up with your family doctor in 1 week  --Follow-up with Dr Davies in 1 week  --Keep your arm immobilizer on at all times except when you take a shower  --Follow-up instructions per Dr Davies  --Take Lovenox as prescribed x1 month for DVT prophylaxis  Day of Discharge   HPI:   Patient sitting up on the side of the bed eating breakfast.  Patient asked for some Tylenol earlier for her left underarm pain.  No adverse events overnight.  No family in the room.    Review of Systems  Gen- No fevers, chills  CV- No chest pain, palpitations  Resp- No cough, dyspnea  GI- No N/V/D, abd pain  MS- +anterior underarm pain  Otherwise ROS is negative except as  mentioned in the HPI.    Vital Signs:   Temp:  [97 °F (36.1 °C)-98.7 °F (37.1 °C)] 98.7 °F (37.1 °C)  Heart Rate:  [66-93] 76  Resp:  [14-18] 16  BP: (121-162)/(81-97) 140/87   Physical Exam:  Constitutional: Alert, WD, WN female in NAD  Eyes: EOMI, sclerae anicteric, no conjunctival injection  Head: NCAT  ENT: Laurium, moist mucous membranes   Respiratory: Non-labored, symmetrical chest expansion, CTAB  Cardiovascular: RRR, no M/R/G, +DP pulses bilaterally  Gastrointestinal: Soft, NT, ND +BS  Musculoskeletal: MERCEDES; no LE edema bilaterally; left shoulder immobilizer in place; FROM in left hand and fingers without swelling  Neurologic: Oriented x4, strength symmetric in all extremities, follows all commands, speech clear  Skin: No rashes on exposed skin  Psychiatric:Pleasant and cooperative; normal affect  Pertinent  and/or Most Recent Results     Results from last 7 days   Lab Units 10/01/19  0853   WBC 10*3/mm3 8.79   HEMOGLOBIN g/dL 12.3   HEMATOCRIT % 39.5   PLATELETS 10*3/mm3 249   SODIUM mmol/L 137   POTASSIUM mmol/L 4.5   CHLORIDE mmol/L 97*   CO2 mmol/L 27.0   BUN mg/dL 10   CREATININE mg/dL 0.66   GLUCOSE mg/dL 119*   CALCIUM mg/dL 9.4           Invalid input(s): PROT, LABALBU        Invalid input(s): TG, LDLCALC, LDLREALC        Brief Urine Lab Results     None        Microbiology Results Abnormal     None        Imaging Results (all)     Procedure Component Value Units Date/Time    XR Shoulder 2+ View Left [[id number]] Collected:  09/30/19 1512     Updated:  09/30/19 1629    Narrative:       EXAMINATION: XR SHOULDER 2+ VW, LEFT-09/30/2019:      INDICATION: Postop.      COMPARISON: NONE.     FINDINGS: Three views of the left shoulder were submitted for review.  Postsurgical changes of left shoulder arthroplasty are identified with  expected anatomic alignment. No acute osseous abnormality. There is mild  widening of the acromioclavicular joint. The visualized left chest  appears intact. No radiopaque foreign  body which would be unexpected.             Impression:       Expected alignment of left shoulder arthroplasty.     D:  09/30/2019  E:  09/30/2019     This report was finalized on 9/30/2019 4:26 PM by Dr. Mario Diaz MD.           Discharge Details        Discharge Medications      New Medications      Instructions Start Date   enoxaparin 40 MG/0.4ML solution syringe  Commonly known as:  LOVENOX   40 mg, Subcutaneous, Daily      oxyCODONE 5 MG immediate release tablet  Commonly known as:  ROXICODONE   5 mg, Oral, Every 4 Hours PRN      Ropivacine HCl-NaCl  Commonly known as:  NAROPIN   6 mL/hr, Peripheral Nerve, Continuous         Continue These Medications      Instructions Start Date   aspirin 81 MG EC tablet   81 mg, Oral, Daily      atorvastatin 40 MG tablet  Commonly known as:  LIPITOR   40 mg, Oral, Daily      CYMBALTA PO   40 mg, Oral, Daily      metFORMIN 500 MG tablet  Commonly known as:  GLUCOPHAGE   500 mg, Oral, 2 Times Daily With Meals      metoprolol succinate XL 25 MG 24 hr tablet  Commonly known as:  TOPROL-XL   25 mg, Oral, Daily      raNITIdine 150 MG tablet  Commonly known as:  ZANTAC   150 mg, Oral, 2 Times Daily           No Known Allergies    Discharge Disposition:  Home or Self Care    Diet:  Hospital:  Diet Order   Procedures   • Diet Regular; Vegetarian; Lacto: Allows Dairy Products     Discharge:   Diet Instructions     Diet: Regular, Specialty Diet; Thin Liquids, No Restrictions; Vegetarian      Discharge Diet:   Regular  Specialty Diet       Fluid Consistency:  Thin Liquids, No Restrictions    Specialty Diets:  Vegetarian        Discharge Activity:   Activity Instructions     Activity as Tolerated          CODE STATUS:    Code Status and Medical Interventions:   Ordered at: 09/30/19 1551     Code Status:    CPR     Medical Interventions (Level of Support Prior to Arrest):    Full     No future appointments.    Additional Instructions for the Follow-ups that You Need to Schedule      Discharge Follow-up with PCP   As directed       Currently Documented PCP:    Mike Olsen MD    PCP Phone Number:    [phone]     Follow Up Details:  1 week; please schedule appointment prior to patient's discharge         Discharge Follow-up with Specified Provider: 1 Week   As directed      Follow Up:  1 Week         Discharge Follow-up with Specified Provider: Dr Davies; 1 week; please schedule appointment prior to patient's discharge   As directed      To:  Dr Davies; 1 week; please schedule appointment prior to patient's discharge             Time Spent on Discharge:  40 minutes    Electronically signed by CAMERON Ybarra, 10/01/19, 10:14 AM.        Electronically signed by Rosi Foss APRN at 10/01/19 1027       Discharge Order (From admission, onward)    Start     Ordered    10/01/19 0803  Discharge patient  Once     Expected Discharge Date:  10/01/19    Discharge Disposition:  Home or Self Care    Physician of Record for Attribution - Please select from Treatment Team:  NIKO DAVIES [[id number]]    Review needed by CMO to determine Physician of Record:  No       Question Answer Comment   Physician of Record for Attribution - Please select from Treatment Team NIKO DAVIES    Review needed by CMO to determine Physician of Record No        10/01/19 0804

## 2019-10-01 NOTE — THERAPY DISCHARGE NOTE
Acute Care - Occupational Therapy Treatment Note/Discharge   [facility]     Patient Name: Morris Whiting  : 1945  MRN: [id number]  Today's Date: 10/1/2019  Onset of Illness/Injury or Date of Surgery: 19  Date of Referral to OT: 19  Referring Physician: Dr. Davies      Admit Date: 2019    Visit Dx:     ICD-10-CM ICD-9-CM   1. Impaired mobility and ADLs Z74.09 799.89   2. Impaired functional mobility, balance, gait, and endurance Z74.09 V49.89   3. Status post shoulder replacement, left Z96.612 V43.61     Patient Active Problem List   Diagnosis   • Status post shoulder replacement, left   • Diabetes mellitus (CMS/HCC)   • Hypertension   • GERD (gastroesophageal reflux disease)   • Anxiety   • Arthritis       Therapy Treatment    Rehabilitation Treatment Summary     Row Name 10/01/19 1045             Treatment Time/Intention    Discipline  occupational therapist  -AR      Document Type  therapy note (daily note);discharge treatment POD#1  -AR      Subjective Information  no complaints  -AR      Mode of Treatment  occupational therapy  -AR      Patient/Family Observations  pt supine, DIL at bedside for teaching  -AR      Patient Effort  good  -AR      Existing Precautions/Restrictions  fall;left;non-weight bearing;shoulder interscalene, [place] Ultra II with pillow  -AR      Recorded by [AR] Debra Barboza OT 10/01/19 1434      Row Name 10/01/19 1045             Vital Signs    Pretreatment Heart Rate (beats/min)  63  -AR      Posttreatment Heart Rate (beats/min)  66  -AR      Pre SpO2 (%)  99  -AR      O2 Delivery Pre Treatment  room air  -AR      Post SpO2 (%)  97  -AR      O2 Delivery Post Treatment  room air  -AR      Pre Patient Position  Supine  -AR      Intra Patient Position  Standing  -AR      Post Patient Position  Supine  -AR      Recorded by [AR] Debra Barboza OT 10/01/19 1434      Row Name 10/01/19 1045             Cognitive Assessment/Intervention- PT/OT     Affect/Mental Status (Cognitive)  WNL  -AR      Orientation Status (Cognition)  oriented x 4  -AR      Follows Commands (Cognition)  follows one step commands;75-90% accuracy  -AR      Recorded by [AR] Debra Barboza, OT 10/01/19 1434      Row Name 10/01/19 1045             Mobility Assessment/Intervention    Extremity Weight-bearing Status  left upper extremity  -AR      Left Upper Extremity (Weight-bearing Status)  non weight-bearing (NWB)  -AR      Recorded by [AR] Debra Barboza, OT 10/01/19 1434      Row Name 10/01/19 1045             Bed Mobility Assessment/Treatment    Bed Mobility Assessment/Treatment  supine-sit;sit-supine;scooting/bridging  -AR      Scooting/Bridging [place] (Bed Mobility)  conditional independence  -AR      Supine-Sit [place] (Bed Mobility)  conditional independence  -AR      Bed Mobility, Safety Issues  decreased use of arms for pushing/pulling  -AR      Assistive Device (Bed Mobility)  bed rails;draw sheet;head of bed elevated  -AR      Comment (Bed Mobility)  Reviewed safe sleeping positions and importance of maintaining NWB LUE  -AR      Recorded by [AR] Debra Barboza, OT 10/01/19 1434      Row Name 10/01/19 1045             Functional Mobility    Functional Mobility- Ind. Level  contact guard assist  -AR      Functional Mobility- Device  other (see comments) LISA HHA  -AR      Functional Mobility-Distance (Feet)  10  -AR      Recorded by [AR] Debra Barboza, OT 10/01/19 1434      Row Name 10/01/19 1045             Transfer Assessment/Treatment    Transfer Assessment/Treatment  sit-stand transfer;stand-sit transfer;toilet transfer  -AR      Maintains Weight-bearing Status (Transfers)  able to maintain  -AR      Comment (Transfers)  cues to attend to location of ARROW pump to prevent dislodgement  -AR      Recorded by [AR] Debra Barboza, OT 10/01/19 1434      Row Name 10/01/19 1045             Sit-Stand Transfer    Sit-Stand [place] (Transfers)   supervision  -AR      Recorded by [AR] Debra Barboza, OT 10/01/19 1434      Row Name 10/01/19 1045             Stand-Sit Transfer    Stand-Sit [place] (Transfers)  supervision  -AR      Recorded by [AR] Debra Barboza, OT 10/01/19 1434      Row Name 10/01/19 1045             Toilet Transfer    Type (Toilet Transfer)  sit-stand;stand-sit  -AR      [place] Level (Toilet Transfer)  supervision  -AR      Assistive Device (Toilet Transfer)  grab bars/safety frame;raised toilet seat  -AR      Recorded by [AR] Debra Barboza, OT 10/01/19 1434      Row Name 10/01/19 1045             ADL Assessment/Intervention    BADL Assessment/Intervention  bathing;upper body dressing;lower body dressing;toileting;feeding;grooming  -AR      Recorded by [AR] Debra Barboza, OT 10/01/19 1434      Row Name 10/01/19 1045             Bathing Assessment/Intervention    Bathing [place] Level  bathing skills;upper body;maximum assist (25% patient effort)  -AR      Bathing Position  edge of bed sitting  -AR      Comment (Bathing)  Educated pt on left shoulder preacutions, axillarty care to maintain, that pt may not shower until interscalene nerve catheter and positioning of LUE while showering.   -AR      Recorded by [AR] Debra Barboza, OT 10/01/19 1434      Row Name 10/01/19 1045             Upper Body Dressing Assessment/Training    Upper Body Dressing [place] Level  doff;don;front opening garment;pull-over garment;maximum assist (25% patient effort) LUE sling  -AR      Assistive Devices (Upper Body Dressing)  one hand technique  -AR      Upper Body Dressing Position  edge of bed sitting;supine  -AR      Comment (Upper Body Dressing)  Educated pt on left shoulder preacutions, ADL retraining to maintain, sling management and care of interscalene nerve catheter during ADLs to avoid dislodgement.   -AR      Recorded by [AR] Debra Barboza, OT 10/01/19 1434      Row Name 10/01/19 1045              Lower Body Dressing Assessment/Training    Lower Body Dressing [place] Level  don;pants/bottoms;undergarment;maximum assist (25% patient effort)  -AR      Lower Body Dressing Position  supported sitting;supported standing  -AR      Recorded by [AR] Debra Barboza, OT 10/01/19 1434      Row Name 10/01/19 1045             Grooming Assessment/Training    [place] Level (Grooming)  wash face, hands;supervision;set up  -AR      Grooming Position  edge of bed sitting  -AR      Recorded by [AR] Debra Barboza OT 10/01/19 1434      Row Name 10/01/19 1045             Self-Feeding Assessment/Training    [place] Level (Feeding)  liquids to mouth;supervision  -AR      Position (Self-Feeding)  edge of bed sitting  -AR      Recorded by [AR] Debra Barboza OT 10/01/19 1434      Row Name 10/01/19 1045             Toileting Assessment/Training    [place] Level (Toileting)  toileting skills;contact guard assist  -AR      Assistive Devices (Toileting)  grab bar/safety frame;raised toilet seat  -AR      Toileting Position  supported sitting;supported standing  -AR      Recorded by [AR] Debra Barboza, OT 10/01/19 1434      Row Name 10/01/19 1045             BADL Safety/Performance    Impairments, BADL Safety/Performance  pain;range of motion;sensory awareness left shoulder precautions  -AR      Recorded by [AR] Debra Barboza OT 10/01/19 1434      Row Name 10/01/19 1045             Therapeutic Exercise    69942 - OT Therapeutic Exercise Minutes  23  -AR      Recorded by [AR] Debra Barboza OT 10/01/19 1434      Row Name 10/01/19 1045             Therapeutic Exercise    Upper Extremity Range of Motion (Therapeutic Exercise)  shoulder flexion/extension, left;shoulder internal/external rotation, left;elbow flexion/extension, left;forearm supination/pronation, left;wrist flexion/extension, left scapualr retractions- bilat  -AR      Hand (Therapeutic Exercise)  finger flexion/extension,  left  -AR      Exercise Type (Therapeutic Exercise)  PROM (passive range of motion);AROM (active range of motion);AAROM (active assistive range of motion)  -AR      Position (Therapeutic Exercise)  supine  -AR      Sets/Reps (Therapeutic Exercise)  1/10  -AR      Comment (Therapeutic Exercise)  Issued and reviewed LUE HEP, pt and DIL completed LUE HEP within physician parameters with supervision. Pt tolerated L shoudler PROM , IR chest/ER 30 with good teachback from DIL.  -AR      Recorded by [AR] Debra Barboza, OT 10/01/19 1434      Row Name 10/01/19 1045             Balance    Balance  static sitting balance;static standing balance  -AR      Recorded by [AR] Debra Barboza OT 10/01/19 1434      Row Name 10/01/19 1045             Static Sitting Balance    Level of [place] (Unsupported Sitting, Static Balance)  independent  -AR      Sitting Position (Unsupported Sitting, Static Balance)  sitting on edge of bed  -AR      Time Able to Maintain Position (Unsupported Sitting, Static Balance)  more than 5 minutes  -AR      Recorded by [AR] Debra Barboza, OT 10/01/19 1434      Row Name 10/01/19 1045             Static Standing Balance    Level of [place] (Supported Standing, Static Balance)  supervision  -AR      Time Able to Maintain Position (Supported Standing, Static Balance)  30 to 45 seconds  -AR      Recorded by [AR] Debra Barboza, OT 10/01/19 1434      Row Name 10/01/19 1045             Positioning and Restraints    Pre-Treatment Position  in bed  -AR      Post Treatment Position  bed  -AR      In Bed  supine;call light within reach;encouraged to call for assist;notified nsg;exit alarm on;with family/caregiver;with brace  -AR      Recorded by [AR] Debra Barboza, OT 10/01/19 1434      Row Name 10/01/19 1045             Pain Assessment    Additional Documentation  Pain Scale: Numbers Pre/Post-Treatment (Group)  -AR      Recorded by [AR] Debra Barboza, OT 10/01/19  1434      Row Name 10/01/19 1045             Pain Scale: Numbers Pre/Post-Treatment    Pain Scale: Numbers, Pretreatment  4/10  -AR      Pain Scale: Numbers, Post-Treatment  2/10  -AR      Pain Location - Side  Left  -AR      Pain Location  shoulder  -AR      Pre/Post Treatment Pain Comment  improved  -AR      Pain Intervention(s)  Medication (See MAR);Cold applied;Repositioned;Ambulation/increased activity  -AR      Recorded by [AR] Debra Barboza, OT 10/01/19 1434      Row Name 10/01/19 1045             Orthotic/Prosthetic Management    Orthosis Location  upper extremity orthosis  -AR      Recorded by [AR] Debra Barboza OT 10/01/19 1434      Row Name 10/01/19 1045             Upper Extremity Orthosis Management    Type (Upper Extremity Orthosis)  [place] Ultra II sling  -AR      Functional Design (Upper Extremity Orthosis)  static orthosis  -AR      Therapeutic Indications (Upper Extremity Orthosis)  post-op positioning/protection;stabilization and support  -AR      Wearing Schedule (Upper Extremity Orthosis)  remove for exercise;remove for hygiene/bathing  -AR      Orthosis Training (Upper Extremity Orthosis)  patient and caregiver;all orthosis skills;activity limitations/precautions;cleaning/care of orthosis;donning/doffing orthosis;orthosis adjustment;orthosis maintenance;purpose/goals of orthosis;sensory precautions;skin inspection/precautions;sleeping precautions;wearing schedule;able to verbalize training;able to show back training;able to teach back training  -AR      Compliance/Wearing Issues (Upper Extremity Orthosis)  patient/caregiver comprehend strategies  -AR      Recorded by [AR] Debra Barboza, OT 10/01/19 1434      Row Name                Wound 09/30/19 1338 Left shoulder Incision    Wound - Properties Group Date first assessed: 09/30/19 [EL] Time first assessed: 1338 [EL] Side: Left [EL] Location: shoulder [EL] Primary Wound Type: Incision [EL] Recorded by:  [EL] Le Arteaga  RN 09/30/19 1338    Row Name 10/01/19 1045             Plan of Care Review    Plan of Care Reviewed With  patient  -AR      Recorded by [AR] Debra Barboza OT 10/01/19 1434      Row Name 10/01/19 1045             Outcome Summary/Treatment Plan (OT)    Daily Summary of Progress (OT)  progress toward functional goals as expected;prepare for discharge  -AR      Anticipated Discharge Disposition (OT)  home with assist;home with home health  -AR      Reason for Discharge (OT Discharge Summary)  patient met all goals and outcomes  -AR      Recorded by [AR] Debra Barboza OT 10/01/19 1434        User Key  (r) = Recorded By, (t) = Taken By, (c) = Cosigned By    Initials Name Effective Dates Discipline    Debra Banuelos OT 06/22/15 -  OT    Le Owen RN 04/10/19 -  Nurse        Wound 09/30/19 1338 Left shoulder Incision (Active)   Dressing Appearance dry;intact;no drainage 10/1/2019  8:00 AM   Drainage Amount none 10/1/2019  8:00 AM       Rehab Goal Summary     Row Name 10/01/19 1045             Transfer Goal 1 (OT)    Progress/Outcome (Transfer Goal 1, OT)  goal met  -AR         Dressing Goal 1 (OT)    Progress/Outcome (Dressing Goal 1, OT)  goal met  -AR         ROM Goal 1 (OT)    Progress/Outcome (ROM Goal 1, OT)  goal met  -AR         Problem Specific Goal 1 (OT)    Progress/Outcome (Problem Specific Goal 1, OT)  goal met  -AR        User Key  (r) = Recorded By, (t) = Taken By, (c) = Cosigned By    Initials Name Provider Type Discipline    Debra Banuelos, OT Occupational Therapist OT          Occupational Therapy Education     Title: PT OT SLP Therapies (Done)     Topic: Occupational Therapy (Done)     Point: ADL training (Done)     Description: Instruct learner(s) on proper safety adaptation and remediation techniques during self care or transfers.   Instruct in proper use of assistive devices.    Learning Progress Summary           Patient Yann, E,TB,D,H, VU,DU by AR at  10/1/2019 10:45 AM    Acceptance, E,TB,D,H, NR,VU by AR at 9/30/2019  4:10 PM   Family Eager, E,TB,D,H, VU,DU by AR at 10/1/2019 10:45 AM                   Point: Home exercise program (Done)     Description: Instruct learner(s) on appropriate technique for monitoring, assisting and/or progressing therapeutic exercises/activities.    Learning Progress Summary           Patient Eager, E,TB,D,H, VU,DU by AR at 10/1/2019 10:45 AM    Acceptance, E,TB,D,H, NR,VU by AR at 9/30/2019  4:10 PM   Family Eager, E,TB,D,H, VU,DU by AR at 10/1/2019 10:45 AM                   Point: Precautions (Done)     Description: Instruct learner(s) on prescribed precautions during self-care and functional transfers.    Learning Progress Summary           Patient Eager, E,TB,D,H, VU,DU by AR at 10/1/2019 10:45 AM    Acceptance, E,TB,D,H, NR,VU by AR at 9/30/2019  4:10 PM   Family Eager, E,TB,D,H, VU,DU by AR at 10/1/2019 10:45 AM                   Point: Body mechanics (Done)     Description: Instruct learner(s) on proper positioning and spine alignment during self-care, functional mobility activities and/or exercises.    Learning Progress Summary           Patient Eager, E,TB,D,H, VU,DU by AR at 10/1/2019 10:45 AM    Acceptance, E,TB,D,H, NR,VU by AR at 9/30/2019  4:10 PM   Family Eager, E,TB,D,H, VU,DU by AR at 10/1/2019 10:45 AM                               User Key     Initials Effective Dates Name Provider Type Discipline    AR 06/22/15 -  Debra Barboza, ROLAND Occupational Therapist OT                OT Recommendation and Plan  Outcome Summary/Treatment Plan (OT)  Daily Summary of Progress (OT): progress toward functional goals as expected, prepare for discharge  Anticipated Discharge Disposition (OT): home with assist, home with home health  Reason for Discharge (OT Discharge Summary): patient met all goals and outcomes  Therapy Frequency (OT Eval): daily  Daily Summary of Progress (OT): progress toward functional goals as expected,  prepare for discharge  Plan of Care Review  Plan of Care Reviewed With: patient  Plan of Care Reviewed With: patient  Outcome Summary: Interscalene infusing at 6, post pain 2/10 left axilla region. Pt tolerated L shoulder PROM , IR chest/ER 30 with excellent teachback from JULIUS. JULIUS present for training, family has hired private caregiver who will be assisting with PROM and sling application. JULIUS desires  therapy to assist with training of caregiver, CM aware. Recommend DC home with family assist. Issued rehab department phone number and enourged them to call with questions.     Outcome Measures     Row Name 10/01/19 1045 10/01/19 0825 09/30/19 1610       How much help from another person do you currently need...    Turning from your back to your side while in flat bed without using bedrails?  --  4  -SJ  --    Moving from lying on back to sitting on the side of a flat bed without bedrails?  --  4  -SJ  --    Moving to and from a bed to a chair (including a wheelchair)?  --  4  -SJ  --    Standing up from a chair using your arms (e.g., wheelchair, bedside chair)?  --  4  -SJ  --    Climbing 3-5 steps with a railing?  --  3  -SJ  --    To walk in hospital room?  --  3  -SJ  --    AM-PAC 6 Clicks Score (PT)  --  22  -SJ  --       How much help from another is currently needed...    Putting on and taking off regular lower body clothing?  2  -AR  --  2  -AR    Bathing (including washing, rinsing, and drying)  2  -AR  --  2  -AR    Toileting (which includes using toilet bed pan or urinal)  3  -AR  --  3  -AR    Putting on and taking off regular upper body clothing  2  -AR  --  2  -AR    Taking care of personal grooming (such as brushing teeth)  3  -AR  --  3  -AR    Eating meals  3  -AR  --  2  -AR    AM-PAC 6 Clicks Score (OT)  15  -AR  --  14  -AR       Functional Assessment    Outcome Measure Options  AM-PAC 6 Clicks Daily Activity (OT)  -AR  AM-PAC 6 Clicks Basic Mobility (PT)  -SJ  AM-PAC 6 Clicks Daily  Activity (OT)  -AR      User Key  (r) = Recorded By, (t) = Taken By, (c) = Cosigned By    Initials Name Provider Type    Ivana Barragan, PT Physical Therapist    Debra Banuelos OT Occupational Therapist           Time Calculation:    Time Calculation- OT     Row Name 10/01/19 1045             Time Calculation- OT    OT Start Time  1045  -AR      Total Timed Code Minutes- OT  83 minute(s)  -AR      OT Received On  10/01/19  -AR      OT Goal Re-Cert Due Date  10/10/19  -AR         Timed Charges    91592 - OT Therapeutic Exercise Minutes  23  -AR      26049 - OT Self Care/Mgmt Minutes  60  -AR        User Key  (r) = Recorded By, (t) = Taken By, (c) = Cosigned By    Initials Name Provider Type    Debra Banuelos OT Occupational Therapist        Therapy Suggested Charges     Code   Minutes Charges    73796 (CPT®) Hc Ot Neuromusc Re Education Ea 15 Min      49015 (CPT®) Hc Ot Ther Proc Ea 15 Min 23 2    41212 (CPT®) Hc Ot Therapeutic Act Ea 15 Min      14517 (CPT®) Hc Ot Manual Therapy Ea 15 Min      84260 (CPT®) Hc Ot Iontophoresis Ea 15 Min      25312 (CPT®) Hc Ot Elec Stim Ea-Per 15 Min      45464 (CPT®) Hc Ot Ultrasound Ea 15 Min      73643 (CPT®) Hc Ot Self Care/Mgmt/Train Ea 15 Min 60 4    Total  83 6        Therapy Charges for Today     Code Description Service Date Service Provider Modifiers Qty    52471564741 HC OT THER PROC EA 15 MIN 9/30/2019 Debra Barboza OT GO 1    82872417441 HC OT SELF CARE/MGMT/TRAIN EA 15 MIN 9/30/2019 Debra Barboza OT GO 1    91502086690 HC OT THER SUPP EA 15 MIN 9/30/2019 Debra Barboza OT GO 3    65908000406 HC OT EVAL LOW COMPLEXITY 4 9/30/2019 Debra Barboza OT GO 1    15898431695 HC OT THER PROC EA 15 MIN 10/1/2019 Debra Barboza OT GO 2    23672451495 HC OT SELF CARE/MGMT/TRAIN EA 15 MIN 10/1/2019 Debra Barboza OT GO 4               OT Discharge Summary  Anticipated Discharge Disposition (OT): home with assist, home with  home health  Reason for Discharge: Discharge from facility  Outcomes Achieved: Able to achieve all goals within established timeline  Discharge Destination: Home with assist    Debra Barboza OT  10/1/2019

## 2019-10-01 NOTE — DISCHARGE SUMMARY
[facility]  DISCHARGE SUMMARY    Patient Name: Morris Whiting  : 1945  MRN: [id number]    Date of Admission: 2019  Date of Discharge: 10/1/2019  Primary Care Physician: Mike Olsen MD    Consults     Date and Time Order Name Status Description    2019 1428 Inpatient Consult to Hospitalist Completed         Hospital Course   Presenting Problem:   Status post shoulder replacement, left [Z96.612]    Active Hospital Problems    Diagnosis  POA   • **Status post shoulder replacement, left [Z96.612]  Not Applicable   • Diabetes mellitus (CMS/HCC) [E11.9]  Yes   • Hypertension [I10]  Yes   • GERD (gastroesophageal reflux disease) [K21.9]  Yes   • Anxiety [F41.9]  Yes   • Arthritis [M19.90]  Yes      Resolved Hospital Problems   No resolved problems to display.      Hospital Course:  Morris Whiting is a 74 y.o. female with PMH significant for anxiety, OA, DM type II, GERD, HTN, and DVT of LLE  s/p knee surgery.  Pt was admitted to Dr Davies on 2019 for elective left shoulder repair.  She underwent left total shoulder arthroplasty.  Hospitalist were consulted for medical mgmt post op.    Patient did well overnight.  Patient worked well with OT this afternoon and this morning.  Patient states she is ready for discharge.  Patient did need some Tylenol this morning for her anterior left underarm pain.  No adverse events overnight.  No family in the room.  Patient will be discharged home and family will transport.  Discharge follow-up recommendations and appointments are listed below.    Discharge Follow Up Recommendations for labs/diagnostics:  --Follow-up with your family doctor in 1 week  --Follow-up with Dr Davies in 1 week  --Keep your arm immobilizer on at all times except when you take a shower  --Follow-up instructions per Dr Davies  --Take Lovenox as prescribed x1 month for DVT prophylaxis  Day of Discharge   HPI:   Patient sitting up on  the side of the bed eating breakfast.  Patient asked for some Tylenol earlier for her left underarm pain.  No adverse events overnight.  No family in the room.    Review of Systems  Gen- No fevers, chills  CV- No chest pain, palpitations  Resp- No cough, dyspnea  GI- No N/V/D, abd pain  MS- +anterior underarm pain  Otherwise ROS is negative except as mentioned in the HPI.    Vital Signs:   Temp:  [97 °F (36.1 °C)-98.7 °F (37.1 °C)] 98.7 °F (37.1 °C)  Heart Rate:  [66-93] 76  Resp:  [14-18] 16  BP: (121-162)/(81-97) 140/87   Physical Exam:  Constitutional: Alert, WD, WN female in NAD  Eyes: EOMI, sclerae anicteric, no conjunctival injection  Head: NCAT  ENT: [place], moist mucous membranes   Respiratory: Non-labored, symmetrical chest expansion, CTAB  Cardiovascular: RRR, no M/R/G, +DP pulses bilaterally  Gastrointestinal: Soft, NT, ND +BS  Musculoskeletal: MERCEDES; no LE edema bilaterally; left shoulder immobilizer in place; FROM in left hand and fingers without swelling  Neurologic: Oriented x4, strength symmetric in all extremities, follows all commands, speech clear  Skin: No rashes on exposed skin  Psychiatric:Pleasant and cooperative; normal affect  Pertinent  and/or Most Recent Results     Results from last 7 days   Lab Units 10/01/19  0853   WBC 10*3/mm3 8.79   HEMOGLOBIN g/dL 12.3   HEMATOCRIT % 39.5   PLATELETS 10*3/mm3 249   SODIUM mmol/L 137   POTASSIUM mmol/L 4.5   CHLORIDE mmol/L 97*   CO2 mmol/L 27.0   BUN mg/dL 10   CREATININE mg/dL 0.66   GLUCOSE mg/dL 119*   CALCIUM mg/dL 9.4           Invalid input(s): PROT, LABALBU        Invalid input(s): TG, LDLCALC, LDLREALC        Brief Urine Lab Results     None        Microbiology Results Abnormal     None        Imaging Results (all)     Procedure Component Value Units Date/Time    XR Shoulder 2+ View Left [[id number]] Collected:  09/30/19 1512     Updated:  09/30/19 1629    Narrative:       EXAMINATION: XR SHOULDER 2+ VW, LEFT-09/30/2019:      INDICATION: Postop.       COMPARISON: NONE.     FINDINGS: Three views of the left shoulder were submitted for review.  Postsurgical changes of left shoulder arthroplasty are identified with  expected anatomic alignment. No acute osseous abnormality. There is mild  widening of the acromioclavicular joint. The visualized left chest  appears intact. No radiopaque foreign body which would be unexpected.             Impression:       Expected alignment of left shoulder arthroplasty.     D:  09/30/2019  E:  09/30/2019     This report was finalized on 9/30/2019 4:26 PM by Dr. Mario Diaz MD.           Discharge Details        Discharge Medications      New Medications      Instructions Start Date   enoxaparin 40 MG/0.4ML solution syringe  Commonly known as:  LOVENOX   40 mg, Subcutaneous, Daily      oxyCODONE 5 MG immediate release tablet  Commonly known as:  ROXICODONE   5 mg, Oral, Every 4 Hours PRN      Ropivacine HCl-NaCl  Commonly known as:  NAROPIN   6 mL/hr, Peripheral Nerve, Continuous         Continue These Medications      Instructions Start Date   aspirin 81 MG EC tablet   81 mg, Oral, Daily      atorvastatin 40 MG tablet  Commonly known as:  LIPITOR   40 mg, Oral, Daily      CYMBALTA PO   40 mg, Oral, Daily      metFORMIN 500 MG tablet  Commonly known as:  GLUCOPHAGE   500 mg, Oral, 2 Times Daily With Meals      metoprolol succinate XL 25 MG 24 hr tablet  Commonly known as:  TOPROL-XL   25 mg, Oral, Daily      raNITIdine 150 MG tablet  Commonly known as:  ZANTAC   150 mg, Oral, 2 Times Daily           No Known Allergies    Discharge Disposition:  Home or Self Care    Diet:  Hospital:  Diet Order   Procedures   • Diet Regular; Vegetarian; Lacto: Allows Dairy Products     Discharge:   Diet Instructions     Diet: Regular, Specialty Diet; Thin Liquids, No Restrictions; Vegetarian      Discharge Diet:   Regular  Specialty Diet       Fluid Consistency:  Thin Liquids, No Restrictions    Specialty Diets:  Vegetarian        Discharge  Activity:   Activity Instructions     Activity as Tolerated          CODE STATUS:    Code Status and Medical Interventions:   Ordered at: 09/30/19 1551     Code Status:    CPR     Medical Interventions (Level of Support Prior to Arrest):    Full     No future appointments.    Additional Instructions for the Follow-ups that You Need to Schedule     Discharge Follow-up with PCP   As directed       Currently Documented PCP:    Mike Olsen MD    PCP Phone Number:    [phone]     Follow Up Details:  1 week; please schedule appointment prior to patient's discharge         Discharge Follow-up with Specified Provider: 1 Week   As directed      Follow Up:  1 Week         Discharge Follow-up with Specified Provider: Dr Davies; 1 week; please schedule appointment prior to patient's discharge   As directed      To:  Dr Davies; 1 week; please schedule appointment prior to patient's discharge             Time Spent on Discharge:  40 minutes    Electronically signed by CAMERON Ybarra, 10/01/19, 10:14 AM.

## 2019-10-01 NOTE — PROGRESS NOTES
Orthopedic Daily Progress Note      CC: s/p left TSA 9/30    Pain well controlled. Pain catheter in place  General: no fevers, chills  Abdomen: no nausea, vomiting, or diarrhea    No other complaints    Physical Exam:  I have reviewed the vital signs.  Temp:  [97 °F (36.1 °C)-98.3 °F (36.8 °C)] 98 °F (36.7 °C)  Heart Rate:  [66-93] 78  Resp:  [14-18] 18  BP: (121-165)/() 162/89    Objective  General Appearance:    Alert, cooperative, no distress  Extremities: No clubbing, cyanosis, or edema to lower extremities  Pulses:  2+ in distal surgical extremity  Skin: Dressing Clean/dry/intact      Results Review:    I have reviewed the labs, radiology results and diagnostic studies:              I have reviewed the medications.    Assessment/Problem List  POD#1 S/p left TSA    Plan  NWBLUE  ultrasling at all times except hygiene  Limit ER to 30 deg  PT pending  Oxygen requirement overnight - clearance for discharge per medicine consult team  Hx of DVT - plan for 30 days lovenox as prophylaxis    Discharge Planning: I expect patient to be discharged to home in 0-1 days depending on clearance with oxygen requirement.    Toño Briones MD  10/01/19  7:05 AM

## 2019-10-01 NOTE — THERAPY DISCHARGE NOTE
Acute Care - Occupational Therapy Treatment Note/Discharge   [place]     Patient Name: Morris Whiting  : 1945  MRN: [id number]  Today's Date: 10/1/2019  Onset of Illness/Injury or Date of Surgery: 19  Date of Referral to OT: 19  Referring Physician: Dr. Davies      Admit Date: 2019    Visit Dx:     ICD-10-CM ICD-9-CM   1. Impaired mobility and ADLs Z74.09 799.89   2. Impaired functional mobility, balance, gait, and endurance Z74.09 V49.89   3. Status post shoulder replacement, left Z96.612 V43.61     Patient Active Problem List   Diagnosis   • Status post shoulder replacement, left   • Diabetes mellitus (CMS/HCC)   • Hypertension   • GERD (gastroesophageal reflux disease)   • Anxiety   • Arthritis       Therapy Treatment    Rehabilitation Treatment Summary     Row Name 10/01/19 1530 10/01/19 1045          Treatment Time/Intention    Discipline  occupational therapist  -AR  occupational therapist  -AR     Document Type  therapy note (daily note)  -AR  therapy note (daily note);discharge treatment POD#1  -AR     Subjective Information  complains of;pain discomfort from body pillow on sling  -AR  no complaints  -AR     Mode of Treatment  occupational therapy  -AR  occupational therapy  -AR     Patient/Family Observations  pt sitting EOB, son and spous at bedside  -AR  pt supine, DIL at bedside for teaching  -AR     Patient Effort  good  -AR  good  -AR     Existing Precautions/Restrictions  fall;left;non-weight bearing;shoulder Don[place], interscalene  -AR  fall;left;non-weight bearing;shoulder interscalene, Donjoy Ultra II with pillow  -AR     Treatment Considerations/Comments  received call from nurse that pt was asking to see her OT for issue with sling  -AR  --     Recorded by [AR] Debra Barboza, OT 10/01/19 1719 [AR] Debra Barboza, OT 10/01/19 1434     Row Name 10/01/19 1530 10/01/19 1045          Vital Signs    Pretreatment Heart Rate (beats/min)  --  63  -AR      Intratreatment Heart Rate (beats/min)  100  -AR  --     Posttreatment Heart Rate (beats/min)  --  66  -AR     Pre SpO2 (%)  --  99  -AR     O2 Delivery Pre Treatment  --  room air  -AR     Intra SpO2 (%)  99  -AR  --     O2 Delivery Intra Treatment  room air  -AR  --     Post SpO2 (%)  --  97  -AR     O2 Delivery Post Treatment  --  room air  -AR     Pre Patient Position  --  Supine  -AR     Intra Patient Position  --  Standing  -AR     Post Patient Position  --  Supine  -AR     Recorded by [AR] Debra Barboza, OT 10/01/19 1719 [AR] Debra Barboza, OT 10/01/19 1434     Row Name 10/01/19 1530 10/01/19 1045          Cognitive Assessment/Intervention- PT/OT    Affect/Mental Status (Cognitive)  WNL  -AR  WNL  -AR     Orientation Status (Cognition)  oriented x 4  -AR  oriented x 4  -AR     Follows Commands (Cognition)  follows one step commands;over 90% accuracy  -AR  follows one step commands;75-90% accuracy  -AR     Recorded by [AR] Debra Barboza, OT 10/01/19 1719 [AR] Debra Barboza, OT 10/01/19 1434     Row Name 10/01/19 1530 10/01/19 1045          Mobility Assessment/Intervention    Extremity Weight-bearing Status  left upper extremity  -AR  left upper extremity  -AR     Left Upper Extremity (Weight-bearing Status)  non weight-bearing (NWB)  -AR  non weight-bearing (NWB)  -AR     Recorded by [AR] Debra Barboza, OT 10/01/19 1719 [AR] Debra Barboza, OT 10/01/19 1434     Row Name 10/01/19 1530 10/01/19 1045          Bed Mobility Assessment/Treatment    Bed Mobility Assessment/Treatment  --  supine-sit;sit-supine;scooting/bridging  -AR     Scooting/Bridging [place] (Bed Mobility)  supervision  -AR  conditional independence  -AR     Supine-Sit [place] (Bed Mobility)  --  conditional independence  -AR     Bed Mobility, Safety Issues  --  decreased use of arms for pushing/pulling  -AR     Assistive Device (Bed Mobility)  --  bed rails;draw sheet;head of bed elevated  -AR      Comment (Bed Mobility)  --  Reviewed safe sleeping positions and importance of maintaining NWB LUE  -AR     Recorded by [AR] Debra Barboza, OT 10/01/19 1719 [AR] Debra Barboza, OT 10/01/19 1434     Row Name 10/01/19 1045             Functional Mobility    Functional Mobility- Ind. Level  contact guard assist  -AR      Functional Mobility- Device  other (see comments) LISA HHA  -AR      Functional Mobility-Distance (Feet)  10  -AR      Recorded by [AR] Debra Barboza OT 10/01/19 1434      Row Name 10/01/19 1045             Transfer Assessment/Treatment    Transfer Assessment/Treatment  sit-stand transfer;stand-sit transfer;toilet transfer  -AR      Maintains Weight-bearing Status (Transfers)  able to maintain  -AR      Comment (Transfers)  cues to attend to location of ARROW pump to prevent dislodgement  -AR      Recorded by [AR] Debra Barboza OT 10/01/19 1434      Row Name 10/01/19 1045             Sit-Stand Transfer    Sit-Stand [place] (Transfers)  supervision  -AR      Recorded by [AR] Debra Barboza OT 10/01/19 1434      Row Name 10/01/19 1045             Stand-Sit Transfer    Stand-Sit [place] (Transfers)  supervision  -AR      Recorded by [AR] Debra Barboza OT 10/01/19 1434      Row Name 10/01/19 1045             Toilet Transfer    Type (Toilet Transfer)  sit-stand;stand-sit  -AR      [place] Level (Toilet Transfer)  supervision  -AR      Assistive Device (Toilet Transfer)  grab bars/safety frame;raised toilet seat  -AR      Recorded by [AR] Debra Barboza OT 10/01/19 1434      Row Name 10/01/19 1530 10/01/19 1045          ADL Assessment/Intervention    86050 - OT Self Care/Mgmt Minutes  18  -AR  --     BADL Assessment/Intervention  --  bathing;upper body dressing;lower body dressing;toileting;feeding;grooming  -AR     Recorded by [AR] Debra Barboza OT 10/01/19 1719 [AR] Debra Barboza OT 10/01/19 1434     Row Name 10/01/19 1045              Bathing Assessment/Intervention    Bathing [place] Level  bathing skills;upper body;maximum assist (25% patient effort)  -AR      Bathing Position  edge of bed sitting  -AR      Comment (Bathing)  Educated pt on left shoulder preacutions, axillarty care to maintain, that pt may not shower until interscalene nerve catheter and positioning of LUE while showering.   -AR      Recorded by [AR] Debra Barboza, OT 10/01/19 1434      Row Name 10/01/19 1530 10/01/19 1045          Upper Body Dressing Assessment/Training    Upper Body Dressing [place] Level  doff;don;moderate assist (50% patient effort)  -AR  doff;don;front opening garment;pull-over garment;maximum assist (25% patient effort) LUE sling  -AR     Assistive Devices (Upper Body Dressing)  one hand technique  -AR  one hand technique  -AR     Upper Body Dressing Position  edge of bed sitting  -AR  edge of bed sitting;supine  -AR     Comment (Upper Body Dressing)  Received call from nurse that pt upset with sling. Son and pt thinking pt was in a different sling last night and it has been switched. Stated that pt has been in the same sling with body pillow since surgery. Pt adament that body pillow was not there prior and son agreeing with pt that prior sling was blue. Son called DIL who verified pt was in same sling. Pt requesting to removed pillow. RN paged Dr. Davies who gave permission for body pillow to be DC. OT removed and refitted sling, educated pt and son on proper fit. Pt c/o left axilla pain, pt's son stating that block was increased and pt should not have pain. Reviewed that block helps with anterior shoulder pain and not axilla, notified nurse.   -AR  Educated pt on left shoulder preacutions, ADL retraining to maintain, sling management and care of interscalene nerve catheter during ADLs to avoid dislodgement.   -AR     Recorded by [AR] Debra Barboza, OT 10/01/19 1234 [AR] Debra Barboza, OT 10/01/19 1434     Row Name 10/01/19  1045             Lower Body Dressing Assessment/Training    Lower Body Dressing [place] Level  don;pants/bottoms;undergarment;maximum assist (25% patient effort)  -AR      Lower Body Dressing Position  supported sitting;supported standing  -AR      Recorded by [AR] Debra Barboza, OT 10/01/19 1434      Row Name 10/01/19 1045             Grooming Assessment/Training    [place] Level (Grooming)  wash face, hands;supervision;set up  -AR      Grooming Position  edge of bed sitting  -AR      Recorded by [AR] Debra Barboza OT 10/01/19 1434      Row Name 10/01/19 1045             Self-Feeding Assessment/Training    [place] Level (Feeding)  liquids to mouth;supervision  -AR      Position (Self-Feeding)  edge of bed sitting  -AR      Recorded by [AR] Debra Barboza OT 10/01/19 1434      Row Name 10/01/19 1045             Toileting Assessment/Training    [place] Level (Toileting)  toileting skills;contact guard assist  -AR      Assistive Devices (Toileting)  grab bar/safety frame;raised toilet seat  -AR      Toileting Position  supported sitting;supported standing  -AR      Recorded by [AR] Debra Barboza, OT 10/01/19 1434      Row Name 10/01/19 1045             BADL Safety/Performance    Impairments, BADL Safety/Performance  pain;range of motion;sensory awareness left shoulder precautions  -AR      Recorded by [AR] Debra Barboza OT 10/01/19 1434      Row Name 10/01/19 1045             Therapeutic Exercise    24331 - OT Therapeutic Exercise Minutes  23  -AR      Recorded by [AR] Debra Barboza OT 10/01/19 1434      Row Name 10/01/19 1045             Therapeutic Exercise    Upper Extremity Range of Motion (Therapeutic Exercise)  shoulder flexion/extension, left;shoulder internal/external rotation, left;elbow flexion/extension, left;forearm supination/pronation, left;wrist flexion/extension, left scapualr retractions- bilat  -AR      Hand (Therapeutic Exercise)  finger  flexion/extension, left  -AR      Exercise Type (Therapeutic Exercise)  PROM (passive range of motion);AROM (active range of motion);AAROM (active assistive range of motion)  -AR      Position (Therapeutic Exercise)  supine  -AR      Sets/Reps (Therapeutic Exercise)  1/10  -AR      Comment (Therapeutic Exercise)  Issued and reviewed LUE HEP, pt and DIL completed LUE HEP within physician parameters with supervision. Pt tolerated L shoudler PROM , IR chest/ER 30 with good teachback from DIL.  -AR      Recorded by [AR] Debra Barboza, OT 10/01/19 1434      Row Name 10/01/19 1045             Balance    Balance  static sitting balance;static standing balance  -AR      Recorded by [AR] Debra Barboza OT 10/01/19 1434      Row Name 10/01/19 1530 10/01/19 1045          Static Sitting Balance    Level of [place] (Unsupported Sitting, Static Balance)  independent  -AR  independent  -AR     Sitting Position (Unsupported Sitting, Static Balance)  sitting on edge of bed  -AR  sitting on edge of bed  -AR     Time Able to Maintain Position (Unsupported Sitting, Static Balance)  more than 5 minutes  -AR  more than 5 minutes  -AR     Recorded by [AR] Debra Barboza, OT 10/01/19 1719 [AR] Debra Barboza, OT 10/01/19 1434     Row Name 10/01/19 1045             Static Standing Balance    Level of [place] (Supported Standing, Static Balance)  supervision  -AR      Time Able to Maintain Position (Supported Standing, Static Balance)  30 to 45 seconds  -AR      Recorded by [AR] Debra Barboza, OT 10/01/19 1434      Row Name 10/01/19 1530 10/01/19 1045          Positioning and Restraints    Pre-Treatment Position  in bed  -AR  in bed  -AR     Post Treatment Position  bed  -AR  bed  -AR     In Bed  sitting EOB;call light within reach;encouraged to call for assist;with family/caregiver;with brace;fowlers;notified nsg  -AR  supine;call light within reach;encouraged to call for assist;notified nsg;exit  alarm on;with family/caregiver;with brace  -AR     Recorded by [AR] Debra Barboza, OT 10/01/19 1719 [AR] Debra Barboza, OT 10/01/19 1434     Row Name 10/01/19 1530 10/01/19 1045          Pain Assessment    Additional Documentation  Pain Scale: Numbers Pre/Post-Treatment (Group)  -AR  Pain Scale: Numbers Pre/Post-Treatment (Group)  -AR     Recorded by [AR] Debra Barboza, OT 10/01/19 1719 [AR] Debra Barboza, OT 10/01/19 1434     Row Name 10/01/19 1530 10/01/19 1045          Pain Scale: Numbers Pre/Post-Treatment    Pain Scale: Numbers, Pretreatment  4/10  -AR  4/10  -AR     Pain Scale: Numbers, Post-Treatment  2/10  -AR  2/10  -AR     Pain Location - Side  Left  -AR  Left  -AR     Pain Location  shoulder  -AR  shoulder  -AR     Pre/Post Treatment Pain Comment  --  improved  -AR     Pain Intervention(s)  --  Medication (See MAR);Cold applied;Repositioned;Ambulation/increased activity  -AR     Recorded by [AR] Debra Barboza, OT 10/01/19 1719 [AR] Debra Barboza, OT 10/01/19 1434     Row Name 10/01/19 1530 10/01/19 1045          Orthotic/Prosthetic Management    Orthosis Location  upper extremity orthosis  -AR  upper extremity orthosis  -AR     Recorded by [AR] Debra Barboza, OT 10/01/19 1719 [AR] Debra Barboza, OT 10/01/19 1434     Row Name 10/01/19 1530 10/01/19 1045          Upper Extremity Orthosis Management    Type (Upper Extremity Orthosis)  Donjoy Ultra II sling  -AR  Donjoy Ultra II sling  -AR     Functional Design (Upper Extremity Orthosis)  static orthosis  -AR  static orthosis  -AR     Therapeutic Indications (Upper Extremity Orthosis)  post-op positioning/protection;stabilization and support  -AR  post-op positioning/protection;stabilization and support  -AR     Wearing Schedule (Upper Extremity Orthosis)  remove for exercise;remove for hygiene/bathing  -AR  remove for exercise;remove for hygiene/bathing  -AR     Orthosis Training (Upper Extremity Orthosis)   patient and caregiver;donning/doffing orthosis;orthosis adjustment;orthosis maintenance;purpose/goals of orthosis;wearing schedule  -AR  patient and caregiver;all orthosis skills;activity limitations/precautions;cleaning/care of orthosis;donning/doffing orthosis;orthosis adjustment;orthosis maintenance;purpose/goals of orthosis;sensory precautions;skin inspection/precautions;sleeping precautions;wearing schedule;able to verbalize training;able to show back training;able to teach back training  -AR     Compliance/Wearing Issues (Upper Extremity Orthosis)  patient/caregiver comprehend strategies  -AR  patient/caregiver comprehend strategies  -AR     Recorded by [AR] Debra Barboza, OT 10/01/19 1719 [AR] Debra Barboza, OT 10/01/19 1434     Row Name                Wound 09/30/19 1338 Left shoulder Incision    Wound - Properties Group Date first assessed: 09/30/19 [EL] Time first assessed: 1338 [EL] Side: Left [EL] Location: shoulder [EL] Primary Wound Type: Incision [EL] Recorded by:  [EL] Le Arteaga RN 09/30/19 1338    Row Name 10/01/19 1045             Plan of Care Review    Plan of Care Reviewed With  patient  -AR      Recorded by [AR] Debra Barboza OT 10/01/19 1434      Row Name 10/01/19 1530 10/01/19 1045          Outcome Summary/Treatment Plan (OT)    Daily Summary of Progress (OT)  progress toward functional goals as expected  -AR  progress toward functional goals as expected;prepare for discharge  -AR     Anticipated Discharge Disposition (OT)  home with assist  -AR  home with assist;home with home health  -AR     Reason for Discharge (OT Discharge Summary)  patient met all goals and outcomes  -AR  patient met all goals and outcomes  -AR     Recorded by [AR] Debra Barboza, ROLAND 10/01/19 1719 [AR] Debra Barboza, OT 10/01/19 1434       User Key  (r) = Recorded By, (t) = Taken By, (c) = Cosigned By    Initials Name Effective Dates Discipline    AR Debra Barboza, OT 06/22/15  -  OT    Le Owen RN 04/10/19 -  Nurse        Wound 09/30/19 1330 Left shoulder Incision (Active)   Dressing Appearance dry;intact;no drainage 10/1/2019  8:00 AM   Drainage Amount none 10/1/2019  8:00 AM       Rehab Goal Summary     Row Name 10/01/19 1045             Transfer Goal 1 (OT)    Progress/Outcome (Transfer Goal 1, OT)  goal met  -AR         Dressing Goal 1 (OT)    Progress/Outcome (Dressing Goal 1, OT)  goal met  -AR         ROM Goal 1 (OT)    Progress/Outcome (ROM Goal 1, OT)  goal met  -AR         Problem Specific Goal 1 (OT)    Progress/Outcome (Problem Specific Goal 1, OT)  goal met  -AR        User Key  (r) = Recorded By, (t) = Taken By, (c) = Cosigned By    Initials Name Provider Type Discipline    Debra Banuelos, OT Occupational Therapist OT          Occupational Therapy Education     Title: PT OT SLP Therapies (Done)     Topic: Occupational Therapy (Done)     Point: ADL training (Done)     Description: Instruct learner(s) on proper safety adaptation and remediation techniques during self care or transfers.   Instruct in proper use of assistive devices.    Learning Progress Summary           Patient Eager, E,TB,D, VU by AR at 10/1/2019  3:30 PM    Eager, E,TB,D,H, VU,DU by AR at 10/1/2019 10:45 AM    Acceptance, E,TB,D,H, NR,VU by AR at 9/30/2019  4:10 PM   Family Eager, E,TB,D, VU by AR at 10/1/2019  3:30 PM    Eager, E,TB,D,H, VU,DU by AR at 10/1/2019 10:45 AM                   Point: Home exercise program (Done)     Description: Instruct learner(s) on appropriate technique for monitoring, assisting and/or progressing therapeutic exercises/activities.    Learning Progress Summary           Patient Eager, E,TB,D,H, VU,DU by AR at 10/1/2019 10:45 AM    Acceptance, E,TB,D,H, NR,VU by AR at 9/30/2019  4:10 PM   Family Eager, E,TB,D,H, VU,DU by AR at 10/1/2019 10:45 AM                   Point: Precautions (Done)     Description: Instruct learner(s) on prescribed precautions during  self-care and functional transfers.    Learning Progress Summary           Patient Eager, E,TB,D, VU by AR at 10/1/2019  3:30 PM    Eager, E,TB,D,H, VU,DU by AR at 10/1/2019 10:45 AM    Acceptance, E,TB,D,H, NR,VU by AR at 9/30/2019  4:10 PM   Family Eager, E,TB,D, VU by AR at 10/1/2019  3:30 PM    Eager, E,TB,D,H, VU,DU by AR at 10/1/2019 10:45 AM                   Point: Body mechanics (Done)     Description: Instruct learner(s) on proper positioning and spine alignment during self-care, functional mobility activities and/or exercises.    Learning Progress Summary           Patient Eager, E,TB,D,H, VU,DU by AR at 10/1/2019 10:45 AM    Acceptance, E,TB,D,H, NR,VU by AR at 9/30/2019  4:10 PM   Family Eager, E,TB,D,H, VU,DU by AR at 10/1/2019 10:45 AM                               User Key     Initials Effective Dates Name Provider Type Discipline    AR 06/22/15 -  Debra Barboza, ROLAND Occupational Therapist OT                OT Recommendation and Plan  Outcome Summary/Treatment Plan (OT)  Daily Summary of Progress (OT): progress toward functional goals as expected  Anticipated Discharge Disposition (OT): home with assist  Reason for Discharge (OT Discharge Summary): patient met all goals and outcomes  Therapy Frequency (OT Eval): daily  Daily Summary of Progress (OT): progress toward functional goals as expected  Plan of Care Review  Plan of Care Reviewed With: patient, spouse, son  Plan of Care Reviewed With: patient, spouse, son  Outcome Summary: Received call from nurse that pt requesting to see OT re: sling. Pt stating she was in a different sling, son also stating the same. OT educated them that pt was in same sling and body pillow has been there since sx. Pt then reuesting it to be DC. RN paged Dr. Davies who gave permission for body pillow to be removed. OT refitted sling and educated son on proper fit.      Outcome Measures     Row Name 10/01/19 1530 10/01/19 1045 10/01/19 5870       How much help from  another person do you currently need...    Turning from your back to your side while in flat bed without using bedrails?  --  --  4  -SJ    Moving from lying on back to sitting on the side of a flat bed without bedrails?  --  --  4  -SJ    Moving to and from a bed to a chair (including a wheelchair)?  --  --  4  -SJ    Standing up from a chair using your arms (e.g., wheelchair, bedside chair)?  --  --  4  -SJ    Climbing 3-5 steps with a railing?  --  --  3  -SJ    To walk in hospital room?  --  --  3  -SJ    AM-PAC 6 Clicks Score (PT)  --  --  22  -SJ       How much help from another is currently needed...    Putting on and taking off regular lower body clothing?  2  -AR  2  -AR  --    Bathing (including washing, rinsing, and drying)  2  -AR  2  -AR  --    Toileting (which includes using toilet bed pan or urinal)  3  -AR  3  -AR  --    Putting on and taking off regular upper body clothing  2  -AR  2  -AR  --    Taking care of personal grooming (such as brushing teeth)  3  -AR  3  -AR  --    Eating meals  3  -AR  3  -AR  --    AM-PAC 6 Clicks Score (OT)  15  -AR  15  -AR  --       Functional Assessment    Outcome Measure Options  --  AM-PAC 6 Clicks Daily Activity (OT)  -AR  AM-PAC 6 Clicks Basic Mobility (PT)  -    Row Name 09/30/19 1610             How much help from another is currently needed...    Putting on and taking off regular lower body clothing?  2  -AR      Bathing (including washing, rinsing, and drying)  2  -AR      Toileting (which includes using toilet bed pan or urinal)  3  -AR      Putting on and taking off regular upper body clothing  2  -AR      Taking care of personal grooming (such as brushing teeth)  3  -AR      Eating meals  2  -AR      AM-PAC 6 Clicks Score (OT)  14  -AR         Functional Assessment    Outcome Measure Options  AM-PAC 6 Clicks Daily Activity (OT)  -AR        User Key  (r) = Recorded By, (t) = Taken By, (c) = Cosigned By    Initials Name Provider Type    JHONATAN Gregg  Ivana, PT Physical Therapist    Debra Banuelos OT Occupational Therapist           Time Calculation:    Time Calculation- OT     Row Name 10/01/19 1530 10/01/19 1045          Time Calculation- OT    OT Start Time  1530  -AR  1045  -AR     Total Timed Code Minutes- OT  18 minute(s)  -AR  83 minute(s)  -AR     OT Received On  10/01/19  -AR  10/01/19  -AR     OT Goal Re-Cert Due Date  10/10/19  -AR  10/10/19  -AR        Timed Charges    26080 - OT Therapeutic Exercise Minutes  --  23  -AR     43955 - OT Self Care/Mgmt Minutes  18  -AR  60  -AR       User Key  (r) = Recorded By, (t) = Taken By, (c) = Cosigned By    Initials Name Provider Type    Debra Banuelos OT Occupational Therapist        Therapy Suggested Charges     Code   Minutes Charges    51895 (CPT®) Hc Ot Neuromusc Re Education Ea 15 Min      57072 (CPT®) Hc Ot Ther Proc Ea 15 Min      95334 (CPT®) Hc Ot Therapeutic Act Ea 15 Min      35093 (CPT®) Hc Ot Manual Therapy Ea 15 Min      11745 (CPT®) Hc Ot Iontophoresis Ea 15 Min      02542 (CPT®) Hc Ot Elec Stim Ea-Per 15 Min      67779 (CPT®) Hc Ot Ultrasound Ea 15 Min      66938 (CPT®) Hc Ot Self Care/Mgmt/Train Ea 15 Min 18 1    Total  18 1        Therapy Charges for Today     Code Description Service Date Service Provider Modifiers Qty    93464971109 HC OT THER PROC EA 15 MIN 9/30/2019 Debra Barboza OT GO 1    50075341398 HC OT SELF CARE/MGMT/TRAIN EA 15 MIN 9/30/2019 Debra Barboza, OT GO 1    49551071443 HC OT THER SUPP EA 15 MIN 9/30/2019 Debra Barboza OT GO 3    04384578437 HC OT EVAL LOW COMPLEXITY 4 9/30/2019 Debra Barboza OT GO 1    53792531066 HC OT THER PROC EA 15 MIN 10/1/2019 Debra Barboza OT GO 2    95423672557 HC OT SELF CARE/MGMT/TRAIN EA 15 MIN 10/1/2019 Debra Barboza OT GO 4               OT Discharge Summary  Anticipated Discharge Disposition (OT): home with assist  Reason for Discharge: Discharge from facility  Outcomes Achieved:  Able to achieve all goals within established timeline  Discharge Destination: Home with assist    Debra Barboza, OT  10/1/2019

## 2019-10-01 NOTE — PLAN OF CARE
Problem: Patient Care Overview  Goal: Plan of Care Review  Outcome: Ongoing (interventions implemented as appropriate)   10/01/19 0951   OTHER   Outcome Summary PT eval completed. Pt presents with difficulty ambulating per PLOF. Pt mildly unsteady when ambulating without assistive device. Improved with use of straight cane, x300ft with CGA. PT recommends d/c home with assist.   Coping/Psychosocial   Plan of Care Reviewed With patient

## 2019-10-01 NOTE — PLAN OF CARE
Problem: Patient Care Overview  Goal: Plan of Care Review  Outcome: Outcome(s) achieved Date Met: 10/01/19   10/01/19 1530   Plan of Care Review   Progress improving   OTHER   Outcome Summary Received call from nurse that pt requesting to see OT re: sling. Pt stating she was in a different sling, son also stating the same. OT educated them that has been in same sling since surgery and body pillow has been there since sx. Pt then requesting body pillow to be removed. RN paged Dr. Davies who gave permission for body pillow to be removed. OT removed pillow, refitted sling and educated son on proper fit.    Coping/Psychosocial   Plan of Care Reviewed With patient;spouse;son       Problem: Shoulder Arthroplasty (Adult)  Goal: Signs and Symptoms of Listed Potential Problems Will be Absent, Minimized or Managed (Shoulder Arthroplasty)  Outcome: Outcome(s) achieved Date Met: 10/01/19   10/01/19 1530   Goal/Outcome Evaluation   Problems Assessed (Shoulder Arthro) pain   Problems Present (Shoulder Arthro) pain

## 2019-10-01 NOTE — PROGRESS NOTES
[facility]    Acute pain service Inpatient Progress Note    Patient Name: Morris Whiting  :  1945  MRN:  [id number]        Acute Pain  Service Inpatient Progress Note:    Analgesia:Excellent  Pain Score:2/10  LOC: alert and awake  Resp Status: room air  Cardiac: VS stable  Side Effects:None  Catheter Site:clean, dressing intact and dry  Cath type: peripheral nerve cath with ON Q  Infusion rate: 6ml/hr  Catheter Plan:Catheter to remain Insitu and Continue catheter infusion rate unchanged

## 2019-10-01 NOTE — PLAN OF CARE
Problem: Patient Care Overview  Goal: Plan of Care Review  Outcome: Ongoing (interventions implemented as appropriate)   10/01/19 3756   Plan of Care Review   Progress improving   OTHER   Outcome Summary Pt VSS and AOx4. Pt pain has been managed with Arrow pump. Arrow is set to 6mL/hr. Pt has been able to ambulate in the heard with one assist this shift. Will continue to monitor.   Coping/Psychosocial   Plan of Care Reviewed With patient;son       Problem: Shoulder Arthroplasty (Adult)  Goal: Signs and Symptoms of Listed Potential Problems Will be Absent, Minimized or Managed (Shoulder Arthroplasty)  Outcome: Ongoing (interventions implemented as appropriate)

## 2019-10-01 NOTE — DISCHARGE PLACEMENT REQUEST
Morris Whiting (74 y.o. Female)       [employer] [employer] Case Manager, [phone]      [address] [address]  Phone:  [phone]  Fax:   Date: Oct 1, 2019      Ambulatory Referral to Home Health     Patient:  Morris Whiting MRN:  [id number]   [address] [facility] [address] :  1945  SSN:    Phone: No phone on record Sex:  F      INSURANCE PAYOR PLAN GROUP # SUBSCRIBER ID   Primary:    [employer] [id number] [place]IXKY [id number]      Referring Provider Information:  NIKO MATOS Phone: [phone] Fax:       Referral Information:   # Visits:  1 Referral Type: Home Health [42]   Urgency:  Routine Referral Reason: Specialty Services Required   Start Date: Oct 1, 2019 End Date:  To be determined by Insurer   Diagnosis: Impaired mobility and ADLs (Z74.09 [ICD-10-CM] 799.89 [ICD-9-CM])  Status post shoulder replacement, left (Z96.612 [ICD-10-CM] V43.61 [ICD-9-CM])      Refer to Dept:   Refer to Provider:   Refer to Facility:  [facility]     Face to Face Visit Date: 10/1/2019  Follow-up provider for Plan of Care? I will be treating the patient on an ongoing basis.  Please send me the Plan of Care for signature.  Follow-up provider: NIKO MATOS [[id number]]  Reason/Clinical Findings: s/p left shoulder rep  Describe mobility limitations that make leaving home difficult: impaired functional mobility, balance, gait, and endurance  Nursing/Therapeutic Services Requested: Skilled Nursing  Nursing/Therapeutic Services Requested: Occupational Therapy  Skilled nursing orders: Medication education  Occupational orders: Activities of daily living  Occupational orders: Fine motor     This document serves as a request of services and does not constitute Insurance authorization or approval of services.  To determine eligibility, please contact the members Insurance carrier to verify and review coverage.     If you have  "medical questions regarding this request for services. Please contact [address] at [phone] between the hours of 8:00am - 5:00pm (Mon-Fri).       Verbal Order Mode: Per protocol: cosign required  Authorizing Provider: Srini Davies MD  Authorizing Provider's NPI: [id number]     Order Entered By: Kristie Quan RN 10/1/2019  1:21 PM                     Date of Birth Social Security Number Address Home Phone MRN    1945  [address] [facility] [address]  [id number]    Baptism Marital Status          None        Admission Date Admission Type Admitting Provider Attending Provider Department, Room/Bed    9/30/19 Elective Srini Davies MD Sajadi, Kaveh Robert, MD [address], S383/1    Discharge Date Discharge Disposition Discharge Destination         Home or Self Care              Attending Provider:  Srini Davies MD    Allergies:  No Known Allergies    Isolation:  None   Infection:  None   Code Status:  CPR    Ht:  157.5 cm (62.01\")   Wt:  71.2 kg (156 lb 15.5 oz)    Admission Cmt:  None   Principal Problem:  Status post shoulder replacement, left [Z96.612]                 Active Insurance as of 9/30/2019     Primary Coverage     Payor Plan Insurance Group Employer/Plan Group    [facility] [id number]     Payor Plan Address Payor Plan Phone Number Payor Plan Fax Number Effective Dates    PO    9/19/2019 - None Entered    [facility] [id number]       Subscriber Name Subscriber Birth Date Member ID       MARIA LUZ WHITING 1945 [id number]                 Emergency Contacts      (Rel.) Home Phone Work Phone Mobile Phone    wilton whiting (Son) -- -- [phone]            Insurance Information                [facility] [employer] [place] Phone:     Subscriber: Maria Luz Whiting Subscriber#: [id number]    Group#: [id number] Precert#:              History & Physical      Clem, " CAMERON Stevens at 09/30/19 1037          Pre-Op H&P  Morris Whiting  [id number]  1945    Chief complaint: Left shoulder pain    HPI:    Patient is a 74 y.o.female who presents with left shoulder pain and found to have left shoulder osteoarthritis.  Failed conservative treatment.  History of DVT and bilateral TKA.  Here today to undergo left total shoulder arthroplasty possible reverse.    Review of Systems:  General ROS: negative for chills, fever or skin lesions;  No changes since last office visit  Cardiovascular ROS: no chest pain or dyspnea on exertion  Respiratory ROS: no cough, shortness of breath, or wheezing    Allergies: No Known Allergies    Home Meds:    No current facility-administered medications on file prior to encounter.      No current outpatient medications on file prior to encounter.       PMH:   Past Medical History:   Diagnosis Date   • Anxiety    • Arthritis    • Diabetes mellitus (CMS/HCC)    • GERD (gastroesophageal reflux disease)    • History of DVT (deep vein thrombosis) 2012   • Hypertension      PSH:    Past Surgical History:   Procedure Laterality Date   • HYSTERECTOMY     • KNEE ARTHROPLASTY Bilateral    • TONSILLECTOMY       Social History:   Tobacco:   Social History     Tobacco Use   Smoking Status Never Smoker   Smokeless Tobacco Never Used      Alcohol:     Social History     Substance and Sexual Activity   Alcohol Use No   • Frequency: Never       Vitals:           BP (!) 165/106 (BP Location: Right arm, Patient Position: Sitting)   Pulse 76   Temp 97 °F (36.1 °C) (Temporal)   Resp 18   SpO2 100%   Breastfeeding? No     Physical Exam:  General Appearance:    Alert, cooperative, no distress, appears stated age   Head:    Normocephalic, without obvious abnormality, atraumatic   Lungs:     Clear to auscultation bilaterally, respirations unlabored    Heart:   Regular rate and rhythm, S1 and S2 normal, no murmur, rub    or gallop    Abdomen:    Soft, non-tender.   +bowel sounds   Breast Exam:    deferred   Genitalia:    deferred   Extremities:   Extremities normal, atraumatic, no cyanosis or edema   Skin:   Skin color, texture, turgor normal, no rashes or lesions   Neurologic:   Grossly intact   Results Review  LABS:  Lab Results   Component Value Date    WBC 6.64 09/23/2019    HGB 12.3 09/23/2019    HCT 39.4 09/23/2019    MCV 92.5 09/23/2019     09/23/2019    GLUCOSE 125 (H) 09/23/2019    BUN 13 09/23/2019    CREATININE 0.72 09/23/2019    EGFRIFAFRI 97 09/23/2019     09/23/2019    K 4.3 09/23/2019     09/23/2019    CO2 28.0 09/23/2019    CALCIUM 9.1 09/23/2019       RADIOLOGY:  Imaging Results (last 72 hours)     ** No results found for the last 72 hours. **          I reviewed the patient's new clinical results.    Cancer Staging (if applicable)  Cancer Patient: __ yes _x_no __unknown; If yes, clinical stage T:__ N:__M:__, stage group or __N/A    Impression: Left shoulder osteoarthritis    Plan: Left total shoulder arthroplasty possible reverse    Luiza Nj, APRN   9/30/2019   10:37 AM    Electronically signed by Srini Davies MD at 09/30/19 1153          Occupational Therapy Notes (most recent note)      Debra Barboza OT at 09/30/19 1540          Problem: Patient Care Overview  Goal: Plan of Care Review  Outcome: Ongoing (interventions implemented as appropriate)   09/30/19 1610   Plan of Care Review   Progress improving   OTHER   Outcome Summary Interscalene infusing at 6, post pain c/o L axilla region, pt declined need for pain medication as well as application of cold pack. Pt tolerated L shoulder PROM FE 90, IR chest/ER 30. No family present for training. Pt with mild unsteadiness, recommend PT eval. Recommend DC home with family assist.    Coping/Psychosocial   Plan of Care Reviewed With patient           Electronically signed by Debra Barboza OT at 09/30/19 4171

## 2019-10-02 NOTE — PROGRESS NOTES
[facility]    Nerve Cath Post Op Call    Patient Name: Morris Whiting  :  1945  MRN:  [id number]  Date of Discharge: 10/1/2019    Nerve Cath Post Op Call:    Catheter Plan:Patient called/No answer/Message left to call [facility] pain service for any questions or complaints

## 2019-10-03 NOTE — PROGRESS NOTES
[facility]    Nerve Cath Post Op Call    Patient Name: Morris Whiting  :  1945  MRN:  [id number]  Date of Discharge: 10/1/2019    Nerve Cath Post Op Call:    Analgesia:Good  Pain scale: spoke with patients daughter.  Side Effects:None  Catheter Site:clean  Patient Controlled ON Q pump infusion rate: 8ml/hr  Catheter Plan:Will continue with plan at home without changes and The patient was instructed to call ON CALL Anesthesia provider for any questions or problems

## 2019-10-04 NOTE — PROGRESS NOTES
[facility]    Nerve Cath Post Op Call    Patient Name: Morris Whiting  :  1945  MRN:  [id number]  Date of Discharge: 10/1/2019    Nerve Cath Post Op Call:    Catheter Plan:Patient/Family member report nerve catheter previously discontinued, tip intact

## 2019-10-17 ENCOUNTER — HOSPITAL ENCOUNTER (EMERGENCY)
Facility: HOSPITAL | Age: 74
Discharge: HOME | End: 2019-10-17
Attending: EMERGENCY MEDICINE | Admitting: EMERGENCY MEDICINE

## 2019-10-17 ENCOUNTER — APPOINTMENT (OUTPATIENT)
Dept: CT IMAGING | Facility: HOSPITAL | Age: 74
End: 2019-10-17

## 2019-10-17 ENCOUNTER — APPOINTMENT (OUTPATIENT)
Dept: GENERAL RADIOLOGY | Facility: HOSPITAL | Age: 74
End: 2019-10-17

## 2019-10-17 VITALS
WEIGHT: 151 LBS | OXYGEN SATURATION: 98 % | BODY MASS INDEX: 27.79 KG/M2 | RESPIRATION RATE: 18 BRPM | TEMPERATURE: 98.4 F | HEIGHT: 62 IN | DIASTOLIC BLOOD PRESSURE: 94 MMHG | HEART RATE: 86 BPM | SYSTOLIC BLOOD PRESSURE: 161 MMHG

## 2019-10-17 DIAGNOSIS — R07.9 LEFT-SIDED CHEST PAIN: ICD-10-CM

## 2019-10-17 DIAGNOSIS — R07.81 PLEURITIC CHEST PAIN: Primary | ICD-10-CM

## 2019-10-17 LAB
ALBUMIN SERPL-MCNC: 4.1 G/DL (ref 3.5–5.2)
ALBUMIN/GLOB SERPL: 1.3 G/DL
ALP SERPL-CCNC: 56 U/L (ref 39–117)
ALT SERPL W P-5'-P-CCNC: 19 U/L (ref 1–33)
ANION GAP SERPL CALCULATED.3IONS-SCNC: 9 MMOL/L (ref 5–15)
AST SERPL-CCNC: 23 U/L (ref 1–32)
BASOPHILS # BLD AUTO: 0.07 10*3/MM3 (ref 0–0.2)
BASOPHILS NFR BLD AUTO: 1 % (ref 0–1.5)
BILIRUB SERPL-MCNC: 0.3 MG/DL (ref 0.2–1.2)
BUN BLD-MCNC: 11 MG/DL (ref 8–23)
BUN/CREAT SERPL: 15.3 (ref 7–25)
CALCIUM SPEC-SCNC: 9.5 MG/DL (ref 8.6–10.5)
CHLORIDE SERPL-SCNC: 102 MMOL/L (ref 98–107)
CO2 SERPL-SCNC: 28 MMOL/L (ref 22–29)
CREAT BLD-MCNC: 0.72 MG/DL (ref 0.57–1)
DEPRECATED RDW RBC AUTO: 50.4 FL (ref 37–54)
EOSINOPHIL # BLD AUTO: 0.15 10*3/MM3 (ref 0–0.4)
EOSINOPHIL NFR BLD AUTO: 2.1 % (ref 0.3–6.2)
ERYTHROCYTE [DISTWIDTH] IN BLOOD BY AUTOMATED COUNT: 14.5 % (ref 12.3–15.4)
GFR SERPL CREATININE-BSD FRML MDRD: 97 ML/MIN/1.73
GLOBULIN UR ELPH-MCNC: 3.2 GM/DL
GLUCOSE BLD-MCNC: 95 MG/DL (ref 65–99)
HCT VFR BLD AUTO: 38.4 % (ref 34–46.6)
HGB BLD-MCNC: 11.7 G/DL (ref 12–15.9)
HOLD SPECIMEN: NORMAL
HOLD SPECIMEN: NORMAL
IMM GRANULOCYTES # BLD AUTO: 0.01 10*3/MM3 (ref 0–0.05)
IMM GRANULOCYTES NFR BLD AUTO: 0.1 % (ref 0–0.5)
LIPASE SERPL-CCNC: 25 U/L (ref 13–60)
LYMPHOCYTES # BLD AUTO: 1.47 10*3/MM3 (ref 0.7–3.1)
LYMPHOCYTES NFR BLD AUTO: 20.8 % (ref 19.6–45.3)
MCH RBC QN AUTO: 28.6 PG (ref 26.6–33)
MCHC RBC AUTO-ENTMCNC: 30.5 G/DL (ref 31.5–35.7)
MCV RBC AUTO: 93.9 FL (ref 79–97)
MONOCYTES # BLD AUTO: 0.68 10*3/MM3 (ref 0.1–0.9)
MONOCYTES NFR BLD AUTO: 9.6 % (ref 5–12)
NEUTROPHILS # BLD AUTO: 4.69 10*3/MM3 (ref 1.7–7)
NEUTROPHILS NFR BLD AUTO: 66.4 % (ref 42.7–76)
NRBC BLD AUTO-RTO: 0 /100 WBC (ref 0–0.2)
NT-PROBNP SERPL-MCNC: 127.5 PG/ML (ref 5–900)
PLATELET # BLD AUTO: 360 10*3/MM3 (ref 140–450)
PMV BLD AUTO: 9.3 FL (ref 6–12)
POTASSIUM BLD-SCNC: 4.5 MMOL/L (ref 3.5–5.2)
PROT SERPL-MCNC: 7.3 G/DL (ref 6–8.5)
RBC # BLD AUTO: 4.09 10*6/MM3 (ref 3.77–5.28)
SODIUM BLD-SCNC: 139 MMOL/L (ref 136–145)
TROPONIN T SERPL-MCNC: <0.01 NG/ML (ref 0–0.03)
WBC NRBC COR # BLD: 7.07 10*3/MM3 (ref 3.4–10.8)
WHOLE BLOOD HOLD SPECIMEN: NORMAL
WHOLE BLOOD HOLD SPECIMEN: NORMAL

## 2019-10-17 PROCEDURE — 93005 ELECTROCARDIOGRAM TRACING: CPT | Performed by: EMERGENCY MEDICINE

## 2019-10-17 PROCEDURE — 99284 EMERGENCY DEPT VISIT MOD MDM: CPT

## 2019-10-17 PROCEDURE — 80053 COMPREHEN METABOLIC PANEL: CPT | Performed by: EMERGENCY MEDICINE

## 2019-10-17 PROCEDURE — 84484 ASSAY OF TROPONIN QUANT: CPT | Performed by: EMERGENCY MEDICINE

## 2019-10-17 PROCEDURE — 0 IOPAMIDOL PER 1 ML: Performed by: EMERGENCY MEDICINE

## 2019-10-17 PROCEDURE — 83690 ASSAY OF LIPASE: CPT | Performed by: EMERGENCY MEDICINE

## 2019-10-17 PROCEDURE — 83880 ASSAY OF NATRIURETIC PEPTIDE: CPT | Performed by: EMERGENCY MEDICINE

## 2019-10-17 PROCEDURE — 71045 X-RAY EXAM CHEST 1 VIEW: CPT

## 2019-10-17 PROCEDURE — 71275 CT ANGIOGRAPHY CHEST: CPT

## 2019-10-17 PROCEDURE — 93005 ELECTROCARDIOGRAM TRACING: CPT

## 2019-10-17 PROCEDURE — 85025 COMPLETE CBC W/AUTO DIFF WBC: CPT | Performed by: EMERGENCY MEDICINE

## 2019-10-17 RX ORDER — ASPIRIN 81 MG/1
324 TABLET, CHEWABLE ORAL ONCE
Status: DISCONTINUED | OUTPATIENT
Start: 2019-10-17 | End: 2019-10-17 | Stop reason: HOSPADM

## 2019-10-17 RX ORDER — SODIUM CHLORIDE 0.9 % (FLUSH) 0.9 %
10 SYRINGE (ML) INJECTION AS NEEDED
Status: DISCONTINUED | OUTPATIENT
Start: 2019-10-17 | End: 2019-10-17 | Stop reason: HOSPADM

## 2019-10-17 RX ADMIN — IOPAMIDOL 61.5 ML: 755 INJECTION, SOLUTION INTRAVENOUS at 16:36

## 2019-10-17 NOTE — ED PROVIDER NOTES
Subjective   Morris Whiting is a 74 y.o. female who presents to the ED with complaints of chest pain. The patient had a left shoulder arthroplasty on 9/30/19. The patient fell out of bed a week ago and landed on her right side, ever since the fall she has been complaining of pain to her left chest underneath her breast. Her pain has worsened throughout the past few days and she experiences pain with deep breaths. She denies shortness of breath, fever, abdominal pain, nausea, vomiting, and leg pain. She is currently taking Lovenox and has a history of DVTs. She also has a history of diabetes, hypertension, GERD, and arthritis. She denies a history of PEs.  She denies tobacco use. There are no other acute complaints at this time.         History provided by:  Patient  Chest Pain   Pain location:  L chest  Pain severity:  Moderate  Chronicity:  New  Associated symptoms: no abdominal pain, no cough, no fever, no headache, no nausea, no shortness of breath and no vomiting        Review of Systems   Constitutional: Negative for fever.   Respiratory: Negative for cough and shortness of breath.         Pleuritic chest pain   Cardiovascular: Positive for chest pain. Negative for leg swelling.   Gastrointestinal: Negative for abdominal pain, nausea and vomiting.   Genitourinary: Negative for dysuria.   Musculoskeletal:        No leg pain.    Skin: Negative for wound.   Allergic/Immunologic: Negative for immunocompromised state.   Neurological: Negative for headaches.   Hematological: Bruises/bleeds easily (on Lovenox).   Psychiatric/Behavioral: Negative.    All other systems reviewed and are negative.      Past Medical History:   Diagnosis Date   • Anxiety    • Arthritis    • Diabetes mellitus (CMS/HCC)    • GERD (gastroesophageal reflux disease)    • History of DVT (deep vein thrombosis) 2012    following Lt knee surgery    • Hypertension        No Known Allergies    Past Surgical History:   Procedure Laterality Date    • HYSTERECTOMY     • INCISIONAL HERNIA REPAIR     • KNEE ARTHROPLASTY Bilateral    • TONSILLECTOMY     • TOTAL SHOULDER ARTHROPLASTY Left 9/30/2019    Procedure: TOTAL SHOULDER ARTHROPLASTY LEFT;  Surgeon: Srini Davies MD;  Location: [facility] OR;  Service: Orthopedics       History reviewed. No pertinent family history.    Social History     Socioeconomic History   • Marital status:      Spouse name: Not on file   • Number of children: Not on file   • Years of education: Not on file   • Highest education level: Not on file   Tobacco Use   • Smoking status: Never Smoker   • Smokeless tobacco: Never Used   Substance and Sexual Activity   • Alcohol use: No     Frequency: Never   • Drug use: No   • Sexual activity: Defer   Social History Narrative    .  Lives with .  No hh, dme or oxygen use prior to admit          Objective   Physical Exam   Constitutional: She is oriented to person, place, and time. She appears well-developed and well-nourished. No distress.   HENT:   Head: Normocephalic and atraumatic.   Eyes: Conjunctivae are normal. Pupils are equal, round, and reactive to light. No scleral icterus.   Neck: Normal range of motion. Neck supple.   Cardiovascular: Normal rate, regular rhythm and normal heart sounds.   No murmur heard.  Pulmonary/Chest: Effort normal and breath sounds normal.   Abdominal: Soft. There is no tenderness.   Musculoskeletal: Normal range of motion.        Right lower leg: Normal. She exhibits no tenderness.        Left lower leg: Normal. She exhibits no tenderness.   Neurological: She is alert and oriented to person, place, and time.   Skin: Skin is warm and dry.   Psychiatric: She has a normal mood and affect. Her behavior is normal.   Nursing note and vitals reviewed.      Procedures         ED Course     CTA chest is negative for PE or other acute process.  No concerning labs.  Her trunk pain is likely secondary to rib contusion after her fall.  Will d/c  home to continue current meds and f/u with her PCP.    Recent Results (from the past 24 hour(s))   Troponin    Collection Time: 10/17/19  2:51 PM   Result Value Ref Range    Troponin T <0.010 0.000 - 0.030 ng/mL   Comprehensive Metabolic Panel    Collection Time: 10/17/19  2:51 PM   Result Value Ref Range    Glucose 95 65 - 99 mg/dL    BUN 11 8 - 23 mg/dL    Creatinine 0.72 0.57 - 1.00 mg/dL    Sodium 139 136 - 145 mmol/L    Potassium 4.5 3.5 - 5.2 mmol/L    Chloride 102 98 - 107 mmol/L    CO2 28.0 22.0 - 29.0 mmol/L    Calcium 9.5 8.6 - 10.5 mg/dL    Total Protein 7.3 6.0 - 8.5 g/dL    Albumin 4.10 3.50 - 5.20 g/dL    ALT (SGPT) 19 1 - 33 U/L    AST (SGOT) 23 1 - 32 U/L    Alkaline Phosphatase 56 39 - 117 U/L    Total Bilirubin 0.3 0.2 - 1.2 mg/dL    eGFR  African Amer 97 >60 mL/min/1.73    Globulin 3.2 gm/dL    A/G Ratio 1.3 g/dL    BUN/Creatinine Ratio 15.3 7.0 - 25.0    Anion Gap 9.0 5.0 - 15.0 mmol/L   Lipase    Collection Time: 10/17/19  2:51 PM   Result Value Ref Range    Lipase 25 13 - 60 U/L   BNP    Collection Time: 10/17/19  2:51 PM   Result Value Ref Range    proBNP 127.5 5.0 - 900.0 pg/mL   Light Blue Top    Collection Time: 10/17/19  2:51 PM   Result Value Ref Range    Extra Tube hold for add-on    Green Top (Gel)    Collection Time: 10/17/19  2:51 PM   Result Value Ref Range    Extra Tube Hold for add-ons.    Lavender Top    Collection Time: 10/17/19  2:51 PM   Result Value Ref Range    Extra Tube hold for add-on    Gold Top - SST    Collection Time: 10/17/19  2:51 PM   Result Value Ref Range    Extra Tube Hold for add-ons.    CBC Auto Differential    Collection Time: 10/17/19  2:51 PM   Result Value Ref Range    WBC 7.07 3.40 - 10.80 10*3/mm3    RBC 4.09 3.77 - 5.28 10*6/mm3    Hemoglobin 11.7 (L) 12.0 - 15.9 g/dL    Hematocrit 38.4 34.0 - 46.6 %    MCV 93.9 79.0 - 97.0 fL    MCH 28.6 26.6 - 33.0 pg    MCHC 30.5 (L) 31.5 - 35.7 g/dL    RDW 14.5 12.3 - 15.4 %    RDW-SD 50.4 37.0 - 54.0 fl    MPV 9.3  "6.0 - 12.0 fL    Platelets 360 140 - 450 10*3/mm3    Neutrophil % 66.4 42.7 - 76.0 %    Lymphocyte % 20.8 19.6 - 45.3 %    Monocyte % 9.6 5.0 - 12.0 %    Eosinophil % 2.1 0.3 - 6.2 %    Basophil % 1.0 0.0 - 1.5 %    Immature Grans % 0.1 0.0 - 0.5 %    Neutrophils, Absolute 4.69 1.70 - 7.00 10*3/mm3    Lymphocytes, Absolute 1.47 0.70 - 3.10 10*3/mm3    Monocytes, Absolute 0.68 0.10 - 0.90 10*3/mm3    Eosinophils, Absolute 0.15 0.00 - 0.40 10*3/mm3    Basophils, Absolute 0.07 0.00 - 0.20 10*3/mm3    Immature Grans, Absolute 0.01 0.00 - 0.05 10*3/mm3    nRBC 0.0 0.0 - 0.2 /100 WBC     Note: In addition to lab results from this visit, the labs listed above may include labs taken at another facility or during a different encounter within the last 24 hours. Please correlate lab times with ED admission and discharge times for further clarification of the services performed during this visit.    CT Angiogram Chest   Preliminary Result   Normal examination. There is no pulmonary embolus and there   is no acute inflammatory process, mass or effusion.              XR Chest 1 View   Final Result   No active disease.       DICTATED:   10/17/2019   EDITED/ls :   10/17/2019        This report was finalized on 10/17/2019 4:10 PM by Dr. Andrew Newsome MD.            Vitals:    10/17/19 1254 10/17/19 1451 10/17/19 1452 10/17/19 1500   BP: 156/90 141/88  147/93   BP Location: Right arm      Patient Position: Sitting      Pulse: 97 75 77 68   Resp: 18      Temp: 98.4 °F (36.9 °C)      TempSrc: Oral      SpO2: 98% 98% 96% 97%   Weight: 68.5 kg (151 lb)      Height: 157.5 cm (62\")        Medications   sodium chloride 0.9 % flush 10 mL (not administered)   aspirin chewable tablet 324 mg (324 mg Oral Not Given 10/17/19 1431)   iopamidol (ISOVUE-370) 76 % injection 100 mL (61.5 mL Intravenous Given 10/17/19 7946)     ECG/EMG Results (last 24 hours)     Procedure Component Value Units Date/Time    ECG 12 Lead [[id number]] Collected:  10/17/19 " 1303     Updated:  10/17/19 1429        ECG 12 Lead                         MDM    Final diagnoses:   Pleuritic chest pain   Left-sided chest pain       Documentation assistance provided by tanner Hays.  Information recorded by the scribdanielle was done at my direction and has been verified and validated by me.     Lula Hays  10/17/19 1439       Calvin Amaro, PA  10/17/19 1704

## 2019-10-17 NOTE — DISCHARGE INSTRUCTIONS
Rest.  Continue your current meds.  Follow up with your PCP if symptoms persist.  Return to ED if worse.

## (undated) DEVICE — T4 PULLOVER TOGA, LARGE

## (undated) DEVICE — DRAPE,SHOULDER,BEACH CHAIR,STERILE: Brand: MEDLINE

## (undated) DEVICE — GLV SURG SIGNATURE TOUCH PF LTX 8 STRL BX/50

## (undated) DEVICE — CVR HNDL LT SURG ACCSSRY BLU STRL

## (undated) DEVICE — 3 BONE CEMENT MIXER: Brand: MIXEVAC

## (undated) DEVICE — PUMP PAIN AUTOFUSER AUTO SELCT NOBOLUS 1TO14ML/HR 550ML DISP

## (undated) DEVICE — DRSNG SURG AQUACEL AG 9X15CM

## (undated) DEVICE — SUT MONOCRYL PLS ANTIB UND 3/0  PS1 27IN

## (undated) DEVICE — PK MAJ SHLDR SPLT 10

## (undated) DEVICE — SYR CATH/TIP 50ML 2OZ STRL 1P/U

## (undated) DEVICE — SLNG ULTRSLING2 11TO13IN MD

## (undated) DEVICE — ANTIBACTERIAL UNDYED BRAIDED (POLYGLACTIN 910), SYNTHETIC ABSORBABLE SUTURE: Brand: COATED VICRYL

## (undated) DEVICE — GLV SURG TRIUMPH CLASSIC PF LTX 7.5 STRL

## (undated) DEVICE — SKIN AFFIX SURG ADHESIVE 72/CS 0.55ML: Brand: MEDLINE

## (undated) DEVICE — POSTN HD UNIV

## (undated) DEVICE — STERILE PVP: Brand: MEDLINE INDUSTRIES, INC.

## (undated) DEVICE — COVER,MAYO STAND,STERILE: Brand: MEDLINE

## (undated) DEVICE — STRYKER PERFORMANCE SERIES SAGITTAL BLADE: Brand: STRYKER PERFORMANCE SERIES

## (undated) DEVICE — SST TWIST DRILL, STANDARD, 2.4MM DIA. X 127MM: Brand: MICROAIRE®